# Patient Record
Sex: FEMALE | Race: WHITE | NOT HISPANIC OR LATINO
[De-identification: names, ages, dates, MRNs, and addresses within clinical notes are randomized per-mention and may not be internally consistent; named-entity substitution may affect disease eponyms.]

---

## 2023-03-15 PROBLEM — Z00.00 ENCOUNTER FOR PREVENTIVE HEALTH EXAMINATION: Status: ACTIVE | Noted: 2023-03-15

## 2023-03-16 ENCOUNTER — APPOINTMENT (OUTPATIENT)
Dept: SURGERY | Facility: CLINIC | Age: 68
End: 2023-03-16

## 2023-03-22 ENCOUNTER — APPOINTMENT (OUTPATIENT)
Dept: SURGERY | Facility: CLINIC | Age: 68
End: 2023-03-22

## 2023-03-22 ENCOUNTER — APPOINTMENT (OUTPATIENT)
Dept: SURGERY | Facility: CLINIC | Age: 68
End: 2023-03-22
Payer: MEDICARE

## 2023-03-22 DIAGNOSIS — Z85.3 PERSONAL HISTORY OF MALIGNANT NEOPLASM OF BREAST: ICD-10-CM

## 2023-03-22 PROCEDURE — 99204 OFFICE O/P NEW MOD 45 MIN: CPT | Mod: 95

## 2023-03-22 RX ORDER — APIXABAN 5 MG/1
5 TABLET, FILM COATED ORAL
Refills: 0 | Status: ACTIVE | COMMUNITY

## 2023-03-22 RX ORDER — LISINOPRIL 30 MG/1
TABLET ORAL
Refills: 0 | Status: ACTIVE | COMMUNITY

## 2023-03-22 NOTE — PLAN
[FreeTextEntry1] : 67-year-old female who status post open right inguinal hernia repair with a mesh plug since in January 2023.  She has developed some postop findings that seem to be constitutional but are highly suggestive of possible reaction to the mesh, she also developed a DVT currently on Eliquis, and she is having difficulty functioning and performing in accomplishing her daily tasks.  She will review her current status for Eliquis and get back to us based on recommendations from her doctors with regards to how long she needs to be on that.  And she will plan to see an allergist and I think that is a great idea since still do blood work and identify any increase in inflammatory factors.  Report of the MRI was reviewed with the patient and it demonstrates no obvious injuries to the nerves.  But she continues to have ongoing pain and discomfort with burning sensations in these constitutional symptoms which is highly suggestive of a reaction to the mesh.  I have had a long robust discussion with her and she would like the mesh removed and I agree.  She would require a right inguinal hernia exploration with removal of mesh and tissue repair of the right groin hernias.  Risks benefits and complications were discussed with her at length.  Questions been answered to her satisfaction and she is agreeable to proceed.  She will work with my office on a date of surgery.  I will need to see her the day before surgery and examine her in person and answer any last-minute questions that she may have.  She will need a note in preparation for surgery from her primary care provider.  We will need some guidance on management of her Eliquis if it is okay to stop for 2 days before surgery and restart probably 1 day after surgery.

## 2023-03-22 NOTE — REASON FOR VISIT
[Initial Evaluation] : an initial evaluation [FreeTextEntry1] : 67-year-old female status post open right inguinal hernia repair with a mesh plug January 13, 2023 with right groin pain and other secondary symptoms.

## 2023-03-22 NOTE — HISTORY OF PRESENT ILLNESS
[de-identified] : Is a 67-year-old female who had an incarcerated right inguinal hernia perhaps it was a femoral I am awaiting operative notes to review but she had a plug placed for the repair.  Ever since the surgery she has appreciated pain and secondary symptoms such as discoloration of the legs with bright red feet changing levels of numbness inside the right thigh, inside the right calf, back of calf and both feet.  Constant tightness behind the knee and the calf with intermittent slight numbness and tingling on the top right hand and fingers.  Hard mass in the groin with burning sensation with a slight none itching rash over the entire body.\par She feels weak, she feels that she has muscular atrophy in the right thigh.  She is very concerned and frustrated.  She also has a new diagnosis for a DVT for which she is on Eliquis and a follow-up surveillance ultrasound shows resolving DVT clot.  She presents specifically to me because she desires mesh removal and a tissue repair.

## 2023-03-22 NOTE — REVIEW OF SYSTEMS
[Negative] : Heme/Lymph [FreeTextEntry2] : Feeling tired, weak, diffuse nonspecific skin rashes without itchiness, redness of skin mostly in the feet, tingling in the fingers mostly in the right hand, burning numbing sensation in the right groin with pain. [FreeTextEntry7] : Status post right inguinal hernia repair

## 2023-03-22 NOTE — PHYSICAL EXAM
[de-identified] : 67-year-old female in no acute distress [de-identified] : Patient reports a well-healed scar right groin but feelings of discomfort, a lump in the right groin is appreciated according to her, with numbness and burning sensation that extends down the right thigh.

## 2023-04-01 ENCOUNTER — NON-APPOINTMENT (OUTPATIENT)
Age: 68
End: 2023-04-01

## 2023-04-07 PROBLEM — Z92.21 PERSONAL HISTORY OF ANTINEOPLASTIC CHEMOTHERAPY: Chronic | Status: ACTIVE | Noted: 2023-04-04

## 2023-04-07 PROBLEM — G57.11 MERALGIA PARESTHETICA, RIGHT LOWER LIMB: Chronic | Status: ACTIVE | Noted: 2023-04-04

## 2023-04-07 PROBLEM — R10.31 RIGHT LOWER QUADRANT PAIN: Chronic | Status: ACTIVE | Noted: 2023-04-04

## 2023-04-07 PROBLEM — I82.411 ACUTE EMBOLISM AND THROMBOSIS OF RIGHT FEMORAL VEIN: Chronic | Status: ACTIVE | Noted: 2023-04-04

## 2023-04-12 ENCOUNTER — APPOINTMENT (OUTPATIENT)
Dept: SURGERY | Facility: CLINIC | Age: 68
End: 2023-04-12
Payer: MEDICARE

## 2023-04-12 ENCOUNTER — TRANSCRIPTION ENCOUNTER (OUTPATIENT)
Age: 68
End: 2023-04-12

## 2023-04-12 VITALS
OXYGEN SATURATION: 97 % | HEART RATE: 74 BPM | SYSTOLIC BLOOD PRESSURE: 105 MMHG | DIASTOLIC BLOOD PRESSURE: 75 MMHG | HEIGHT: 65 IN | BODY MASS INDEX: 15.66 KG/M2 | TEMPERATURE: 97.5 F | WEIGHT: 94 LBS | RESPIRATION RATE: 14 BRPM

## 2023-04-12 DIAGNOSIS — R10.31 RIGHT LOWER QUADRANT PAIN: ICD-10-CM

## 2023-04-12 DIAGNOSIS — I82.409 ACUTE EMBOLISM AND THROMBOSIS OF UNSPECIFIED DEEP VEINS OF UNSPECIFIED LOWER EXTREMITY: ICD-10-CM

## 2023-04-12 DIAGNOSIS — Z98.890 OTHER SPECIFIED POSTPROCEDURAL STATES: ICD-10-CM

## 2023-04-12 DIAGNOSIS — Z87.19 OTHER SPECIFIED POSTPROCEDURAL STATES: ICD-10-CM

## 2023-04-12 PROCEDURE — 99215 OFFICE O/P EST HI 40 MIN: CPT | Mod: 57

## 2023-04-12 PROCEDURE — 99205 OFFICE O/P NEW HI 60 MIN: CPT

## 2023-04-12 NOTE — REASON FOR VISIT
[Initial Evaluation] : an initial evaluation [FreeTextEntry1] : preop eval, for chronic pain right groin s/p mesh repair of right femoral hernia.

## 2023-04-12 NOTE — HISTORY OF PRESENT ILLNESS
[de-identified] : 67-year-old female who status post open right inguinal hernia repair with a mesh plug since in January 2023. She has developed some postop findings that seem to be constitutional but are highly suggestive of possible reaction to the mesh, she also developed a DVT currently on Eliquis, and she is having difficulty functioning and performing in accomplishing her daily tasks. She will review her current status for Eliquis and get back to us based on recommendations from her doctors with regards to how long she needs to be on that. And she will plan to see an allergist and I think that is a great idea since still do blood work and identify any increase in inflammatory factors. Report of the MRI was reviewed with the patient and it demonstrates no obvious injuries to the nerves. But she continues to have ongoing pain and discomfort with burning sensations in these constitutional symptoms which is highly suggestive of a reaction to the mesh. I have had a long robust discussion with her and she would like the mesh removed and I agree. She would require a right inguinal hernia exploration with removal of mesh and tissue repair of the right groin hernias. Risks benefits and complications were discussed with her at length. Questions been answered to her satisfaction and she is agreeable to proceed. She will work with my office on a date of surgery. I will need to see her the day before surgery and examine her in person and answer any last-minute questions that she may have. She will need a note in preparation for surgery from her primary care provider. We will need some guidance on management of her Eliquis if it is okay to stop for 2 days before surgery and restart probably 1 day after surgery.   She has since seen the vascular surgeon who has reviewed the venogram and reports that the srikanth will likely open after the mesh is removed, to hold her eliquis for 2 days prior surgery and restat 24 hours after surgery\par

## 2023-04-12 NOTE — PLAN
[FreeTextEntry1] : 67-year-old female with chronic pain secondary to mesh implantation right groin for a femoral hernia repair with plug.  She is also sustained a DVT and she is currently on Eliquis.  A preoperative venogram was undertaken which was reviewed by a local vascular surgeon who reports that she is okay to hold her Eliquis for 2 days prior to surgery and she is done that and it is okay to restart approximately 24 hours after surgery.  She has been counseled on a exploration of the right groin with explantation of mesh and tissue repair of the inguinal hernia and femoral hernia.  Risks benefits and complications of been discussed with her at length.  Questions been answered to her satisfaction.  And she is agreeable to proceed.

## 2023-04-12 NOTE — PHYSICAL EXAM
[Normal Breath Sounds] : Normal breath sounds [Normal Heart Sounds] : normal heart sounds [de-identified] : Healthy-appearing 67-year-old female no acute distress [de-identified] : PERRLA EOMI anicteric sclera [de-identified] : Supple [de-identified] : Well-healed right groin scar.  Dense subcutaneous nodularity appreciated likely the mesh in the femoral space.  The area is exquisitely tender to palpation.  This is adjacent to the femoral vessels.  No obvious signs of infection are noted.  The left groin is examined she may have a recurrent bulge on Valsalva right at the pubis.  She also has a well-healed scar from prior tissue repair in the left groin.

## 2023-04-13 ENCOUNTER — OUTPATIENT (OUTPATIENT)
Dept: OUTPATIENT SERVICES | Facility: HOSPITAL | Age: 68
LOS: 1 days | End: 2023-04-13
Payer: MEDICARE

## 2023-04-13 ENCOUNTER — TRANSCRIPTION ENCOUNTER (OUTPATIENT)
Age: 68
End: 2023-04-13

## 2023-04-13 ENCOUNTER — RESULT REVIEW (OUTPATIENT)
Age: 68
End: 2023-04-13

## 2023-04-13 ENCOUNTER — APPOINTMENT (OUTPATIENT)
Dept: SURGERY | Facility: HOSPITAL | Age: 68
End: 2023-04-13

## 2023-04-13 VITALS
HEART RATE: 76 BPM | SYSTOLIC BLOOD PRESSURE: 119 MMHG | OXYGEN SATURATION: 96 % | DIASTOLIC BLOOD PRESSURE: 68 MMHG | RESPIRATION RATE: 16 BRPM | TEMPERATURE: 98 F

## 2023-04-13 VITALS
HEART RATE: 77 BPM | DIASTOLIC BLOOD PRESSURE: 73 MMHG | TEMPERATURE: 98 F | OXYGEN SATURATION: 97 % | WEIGHT: 95.02 LBS | HEIGHT: 65 IN | RESPIRATION RATE: 18 BRPM | SYSTOLIC BLOOD PRESSURE: 115 MMHG

## 2023-04-13 DIAGNOSIS — C50.912 MALIGNANT NEOPLASM OF UNSPECIFIED SITE OF LEFT FEMALE BREAST: Chronic | ICD-10-CM

## 2023-04-13 DIAGNOSIS — Z98.890 OTHER SPECIFIED POSTPROCEDURAL STATES: ICD-10-CM

## 2023-04-13 DIAGNOSIS — Z98.890 OTHER SPECIFIED POSTPROCEDURAL STATES: Chronic | ICD-10-CM

## 2023-04-13 DIAGNOSIS — R10.31 RIGHT LOWER QUADRANT PAIN: ICD-10-CM

## 2023-04-13 DIAGNOSIS — Z90.13 ACQUIRED ABSENCE OF BILATERAL BREASTS AND NIPPLES: Chronic | ICD-10-CM

## 2023-04-13 LAB
GRAM STN FLD: SIGNIFICANT CHANGE UP
SPECIMEN SOURCE: SIGNIFICANT CHANGE UP

## 2023-04-13 PROCEDURE — 49553 RPR FEM HERNIA INIT BLOCKED: CPT | Mod: RT

## 2023-04-13 PROCEDURE — 49505 PRP I/HERN INIT REDUC >5 YR: CPT | Mod: RT

## 2023-04-13 PROCEDURE — 49505 PRP I/HERN INIT REDUC >5 YR: CPT | Mod: AS,22,RT

## 2023-04-13 PROCEDURE — 88300 SURGICAL PATH GROSS: CPT | Mod: 26,59

## 2023-04-13 PROCEDURE — 49553 RPR FEM HERNIA INIT BLOCKED: CPT | Mod: AS,22,RT

## 2023-04-13 PROCEDURE — 87075 CULTR BACTERIA EXCEPT BLOOD: CPT

## 2023-04-13 PROCEDURE — 88300 SURGICAL PATH GROSS: CPT

## 2023-04-13 PROCEDURE — 13101 CMPLX RPR TRUNK 2.6-7.5 CM: CPT | Mod: XS

## 2023-04-13 PROCEDURE — 87205 SMEAR GRAM STAIN: CPT

## 2023-04-13 PROCEDURE — 87102 FUNGUS ISOLATION CULTURE: CPT

## 2023-04-13 PROCEDURE — C1889: CPT

## 2023-04-13 PROCEDURE — 88304 TISSUE EXAM BY PATHOLOGIST: CPT | Mod: 26

## 2023-04-13 PROCEDURE — 87070 CULTURE OTHR SPECIMN AEROBIC: CPT

## 2023-04-13 PROCEDURE — 88304 TISSUE EXAM BY PATHOLOGIST: CPT

## 2023-04-13 PROCEDURE — 20680 REMOVAL OF IMPLANT DEEP: CPT | Mod: XS

## 2023-04-13 DEVICE — CLIP APPLIER ETHICON LIGACLIP 9 3/8" SMALL: Type: IMPLANTABLE DEVICE | Status: FUNCTIONAL

## 2023-04-13 DEVICE — SURGIFLO HEMOSTATIC MATRIX KIT: Type: IMPLANTABLE DEVICE | Status: FUNCTIONAL

## 2023-04-13 RX ORDER — OXYCODONE HYDROCHLORIDE 5 MG/1
5 TABLET ORAL ONCE
Refills: 0 | Status: DISCONTINUED | OUTPATIENT
Start: 2023-04-13 | End: 2023-04-13

## 2023-04-13 RX ORDER — ONDANSETRON 8 MG/1
1 TABLET, FILM COATED ORAL
Qty: 1 | Refills: 0
Start: 2023-04-13 | End: 2023-04-15

## 2023-04-13 RX ORDER — OXYCODONE HYDROCHLORIDE 5 MG/1
1 TABLET ORAL
Qty: 18 | Refills: 0
Start: 2023-04-13 | End: 2023-04-15

## 2023-04-13 RX ORDER — ONDANSETRON 8 MG/1
4 TABLET, FILM COATED ORAL ONCE
Refills: 0 | Status: COMPLETED | OUTPATIENT
Start: 2023-04-13 | End: 2023-04-13

## 2023-04-13 RX ORDER — SODIUM CHLORIDE 9 MG/ML
1000 INJECTION, SOLUTION INTRAVENOUS
Refills: 0 | Status: DISCONTINUED | OUTPATIENT
Start: 2023-04-13 | End: 2023-04-13

## 2023-04-13 RX ORDER — HYDROMORPHONE HYDROCHLORIDE 2 MG/ML
0.5 INJECTION INTRAMUSCULAR; INTRAVENOUS; SUBCUTANEOUS
Refills: 0 | Status: DISCONTINUED | OUTPATIENT
Start: 2023-04-13 | End: 2023-04-13

## 2023-04-13 RX ORDER — ONDANSETRON 8 MG/1
8 TABLET, FILM COATED ORAL ONCE
Refills: 0 | Status: COMPLETED | OUTPATIENT
Start: 2023-04-13 | End: 2023-04-13

## 2023-04-13 RX ADMIN — HYDROMORPHONE HYDROCHLORIDE 0.5 MILLIGRAM(S): 2 INJECTION INTRAMUSCULAR; INTRAVENOUS; SUBCUTANEOUS at 15:12

## 2023-04-13 RX ADMIN — ONDANSETRON 4 MILLIGRAM(S): 8 TABLET, FILM COATED ORAL at 14:15

## 2023-04-13 RX ADMIN — SODIUM CHLORIDE 50 MILLILITER(S): 9 INJECTION, SOLUTION INTRAVENOUS at 10:12

## 2023-04-13 RX ADMIN — HYDROMORPHONE HYDROCHLORIDE 0.5 MILLIGRAM(S): 2 INJECTION INTRAMUSCULAR; INTRAVENOUS; SUBCUTANEOUS at 14:14

## 2023-04-13 RX ADMIN — ONDANSETRON 8 MILLIGRAM(S): 8 TABLET, FILM COATED ORAL at 18:38

## 2023-04-13 RX ADMIN — HYDROMORPHONE HYDROCHLORIDE 0.5 MILLIGRAM(S): 2 INJECTION INTRAMUSCULAR; INTRAVENOUS; SUBCUTANEOUS at 14:24

## 2023-04-13 RX ADMIN — HYDROMORPHONE HYDROCHLORIDE 0.5 MILLIGRAM(S): 2 INJECTION INTRAMUSCULAR; INTRAVENOUS; SUBCUTANEOUS at 15:02

## 2023-04-13 NOTE — ASU DISCHARGE PLAN (ADULT/PEDIATRIC) - CARE PROVIDER_API CALL
Celeste Ramos)  Surgery  [address] [place] [address]  Phone: [phone]  Fax: [phone]  Follow Up Time: 2 weeks

## 2023-04-13 NOTE — BRIEF OPERATIVE NOTE - NSICDXBRIEFPROCEDURE_GEN_ALL_CORE_FT
PROCEDURES:  Removal of right inguinal mesh 13-Apr-2023 14:29:58 in femoral space Venus Olivier  Repair of recurrent femoral hernia 13-Apr-2023 14:30:25  Venus Olivier   PROCEDURES:  Exploration of right groin 13-Apr-2023 15:41:37 removal of mesh Venus Olivier  Open repair of femoral hernia in adult 13-Apr-2023 15:44:06 using wire and repair of inguinal hernia Venus Olivier

## 2023-04-13 NOTE — ASU DISCHARGE PLAN (ADULT/PEDIATRIC) - CALL YOUR DOCTOR IF YOU HAVE ANY OF THE FOLLOWING:
Bleeding that does not stop/Swelling that gets worse/Pain not relieved by Medications/Fever greater than (need to indicate Fahrenheit or Celsius)/Wound/Surgical Site with redness, or foul smelling discharge or pus/Unable to urinate Bleeding that does not stop/Swelling that gets worse/Pain not relieved by Medications/Fever greater than (need to indicate Fahrenheit or Celsius)/Wound/Surgical Site with redness, or foul smelling discharge or pus/Nausea and vomiting that does not stop/Unable to urinate

## 2023-04-13 NOTE — ASU DISCHARGE PLAN (ADULT/PEDIATRIC) - ASU DC SPECIAL INSTRUCTIONSFT
May shower tomorrow am  Take extra strength tylenol 500 mg 2 tabs every 6 hours , alternate with oxycodone to maintain good pain control.      ice pack to right groin   Lots of walking if tolerated   Drink plenty of water   Take a stool softener or a laxative if no BM in 48 hours to avoid constipation   Follow up with Dr Ramos in 2 - 3 weeks.

## 2023-04-13 NOTE — BRIEF OPERATIVE NOTE - OPERATION/FINDINGS
painful femoral mesh painful femoral mesh, canal cultured , mesh removed   repaired femoral and inguinal hernias

## 2023-04-13 NOTE — ASU PATIENT PROFILE, ADULT - NSICDXPASTMEDICALHX_GEN_ALL_CORE_FT
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

## 2023-04-13 NOTE — ASU PATIENT PROFILE, ADULT - NSICDXPASTSURGICALHX_GEN_ALL_CORE_FT
PAST SURGICAL HISTORY:  Bilateral breast cancer     H/O mastectomy, bilateral     H/O right inguinal hernia repair

## 2023-04-13 NOTE — BRIEF OPERATIVE NOTE - NSICDXBRIEFPREOP_GEN_ALL_CORE_FT
PRE-OP DIAGNOSIS:  History of femoral hernia repair 13-Apr-2023 14:20:48 now with painful mesh Venus Olivier

## 2023-04-13 NOTE — ASU DISCHARGE PLAN (ADULT/PEDIATRIC) - NS MD DC FALL RISK RISK
For information on Fall & Injury Prevention, visit: [email or web address] OR  [email or web address] OR  https://www.cdc.gov/steadi/patient.html

## 2023-04-13 NOTE — ASU DISCHARGE PLAN (ADULT/PEDIATRIC) - PROCEDURE
S/P Removal of mesh ,repair of right inguinal/femoral hernia S/P Exploration of right groin,  Removal of mesh ,repair of right inguinal/femoral hernia

## 2023-04-13 NOTE — BRIEF OPERATIVE NOTE - NSICDXBRIEFPOSTOP_GEN_ALL_CORE_FT
POST-OP DIAGNOSIS:  History of femoral hernia repair 13-Apr-2023 14:21:07 now with painful mesh Venus Olivier

## 2023-04-15 ENCOUNTER — INPATIENT (INPATIENT)
Facility: HOSPITAL | Age: 68
LOS: 3 days | Discharge: NURSING, REHAB OR LONG-TERM CARE | DRG: 640 | End: 2023-04-19
Attending: HOSPITALIST | Admitting: STUDENT IN AN ORGANIZED HEALTH CARE EDUCATION/TRAINING PROGRAM
Payer: MEDICARE

## 2023-04-15 VITALS
OXYGEN SATURATION: 96 % | HEIGHT: 65 IN | TEMPERATURE: 98 F | HEART RATE: 72 BPM | SYSTOLIC BLOOD PRESSURE: 123 MMHG | RESPIRATION RATE: 20 BRPM | DIASTOLIC BLOOD PRESSURE: 74 MMHG | WEIGHT: 95.02 LBS

## 2023-04-15 DIAGNOSIS — Z98.890 OTHER SPECIFIED POSTPROCEDURAL STATES: Chronic | ICD-10-CM

## 2023-04-15 DIAGNOSIS — R10.9 UNSPECIFIED ABDOMINAL PAIN: ICD-10-CM

## 2023-04-15 DIAGNOSIS — Z90.13 ACQUIRED ABSENCE OF BILATERAL BREASTS AND NIPPLES: Chronic | ICD-10-CM

## 2023-04-15 DIAGNOSIS — C50.912 MALIGNANT NEOPLASM OF UNSPECIFIED SITE OF LEFT FEMALE BREAST: Chronic | ICD-10-CM

## 2023-04-15 DIAGNOSIS — I80.10 PHLEBITIS AND THROMBOPHLEBITIS OF UNSPECIFIED FEMORAL VEIN: ICD-10-CM

## 2023-04-15 LAB
ALBUMIN SERPL ELPH-MCNC: 3.2 G/DL — LOW (ref 3.3–5)
ALP SERPL-CCNC: 55 U/L — SIGNIFICANT CHANGE UP (ref 40–120)
ALT FLD-CCNC: 28 U/L — SIGNIFICANT CHANGE UP (ref 10–45)
ANION GAP SERPL CALC-SCNC: 8 MMOL/L — SIGNIFICANT CHANGE UP (ref 5–17)
APTT BLD: 25.5 SEC — LOW (ref 27.5–35.5)
AST SERPL-CCNC: 31 U/L — SIGNIFICANT CHANGE UP (ref 10–40)
BASOPHILS # BLD AUTO: 0.06 K/UL — SIGNIFICANT CHANGE UP (ref 0–0.2)
BASOPHILS NFR BLD AUTO: 0.6 % — SIGNIFICANT CHANGE UP (ref 0–2)
BILIRUB SERPL-MCNC: 0.5 MG/DL — SIGNIFICANT CHANGE UP (ref 0.2–1.2)
BLD GP AB SCN SERPL QL: SIGNIFICANT CHANGE UP
BUN SERPL-MCNC: 12 MG/DL — SIGNIFICANT CHANGE UP (ref 7–23)
CALCIUM SERPL-MCNC: 9.1 MG/DL — SIGNIFICANT CHANGE UP (ref 8.4–10.5)
CHLORIDE SERPL-SCNC: 106 MMOL/L — SIGNIFICANT CHANGE UP (ref 96–108)
CO2 SERPL-SCNC: 28 MMOL/L — SIGNIFICANT CHANGE UP (ref 22–31)
CREAT SERPL-MCNC: 0.77 MG/DL — SIGNIFICANT CHANGE UP (ref 0.5–1.3)
EGFR: 84 ML/MIN/1.73M2 — SIGNIFICANT CHANGE UP
EOSINOPHIL # BLD AUTO: 0.07 K/UL — SIGNIFICANT CHANGE UP (ref 0–0.5)
EOSINOPHIL NFR BLD AUTO: 0.7 % — SIGNIFICANT CHANGE UP (ref 0–6)
FLUAV AG NPH QL: SIGNIFICANT CHANGE UP
FLUBV AG NPH QL: SIGNIFICANT CHANGE UP
GLUCOSE SERPL-MCNC: 107 MG/DL — HIGH (ref 70–99)
HCT VFR BLD CALC: 37.9 % — SIGNIFICANT CHANGE UP (ref 34.5–45)
HGB BLD-MCNC: 13 G/DL — SIGNIFICANT CHANGE UP (ref 11.5–15.5)
IMM GRANULOCYTES NFR BLD AUTO: 0.4 % — SIGNIFICANT CHANGE UP (ref 0–0.9)
INR BLD: 1.24 RATIO — HIGH (ref 0.88–1.16)
LYMPHOCYTES # BLD AUTO: 0.67 K/UL — LOW (ref 1–3.3)
LYMPHOCYTES # BLD AUTO: 6.6 % — LOW (ref 13–44)
MCHC RBC-ENTMCNC: 33.7 PG — SIGNIFICANT CHANGE UP (ref 27–34)
MCHC RBC-ENTMCNC: 34.3 GM/DL — SIGNIFICANT CHANGE UP (ref 32–36)
MCV RBC AUTO: 98.2 FL — SIGNIFICANT CHANGE UP (ref 80–100)
MONOCYTES # BLD AUTO: 0.76 K/UL — SIGNIFICANT CHANGE UP (ref 0–0.9)
MONOCYTES NFR BLD AUTO: 7.5 % — SIGNIFICANT CHANGE UP (ref 2–14)
NEUTROPHILS # BLD AUTO: 8.51 K/UL — HIGH (ref 1.8–7.4)
NEUTROPHILS NFR BLD AUTO: 84.2 % — HIGH (ref 43–77)
NRBC # BLD: 0 /100 WBCS — SIGNIFICANT CHANGE UP (ref 0–0)
PLATELET # BLD AUTO: 146 K/UL — LOW (ref 150–400)
POTASSIUM SERPL-MCNC: 4 MMOL/L — SIGNIFICANT CHANGE UP (ref 3.5–5.3)
POTASSIUM SERPL-SCNC: 4 MMOL/L — SIGNIFICANT CHANGE UP (ref 3.5–5.3)
PROT SERPL-MCNC: 6 G/DL — SIGNIFICANT CHANGE UP (ref 6–8.3)
PROTHROM AB SERPL-ACNC: 14.4 SEC — HIGH (ref 10.5–13.4)
RBC # BLD: 3.86 M/UL — SIGNIFICANT CHANGE UP (ref 3.8–5.2)
RBC # FLD: 11.3 % — SIGNIFICANT CHANGE UP (ref 10.3–14.5)
RSV RNA NPH QL NAA+NON-PROBE: SIGNIFICANT CHANGE UP
SARS-COV-2 RNA SPEC QL NAA+PROBE: SIGNIFICANT CHANGE UP
SODIUM SERPL-SCNC: 142 MMOL/L — SIGNIFICANT CHANGE UP (ref 135–145)
TROPONIN I, HIGH SENSITIVITY RESULT: 8.6 NG/L — SIGNIFICANT CHANGE UP
WBC # BLD: 10.11 K/UL — SIGNIFICANT CHANGE UP (ref 3.8–10.5)
WBC # FLD AUTO: 10.11 K/UL — SIGNIFICANT CHANGE UP (ref 3.8–10.5)

## 2023-04-15 PROCEDURE — 99223 1ST HOSP IP/OBS HIGH 75: CPT

## 2023-04-15 PROCEDURE — 93971 EXTREMITY STUDY: CPT | Mod: 26,RT

## 2023-04-15 PROCEDURE — 99285 EMERGENCY DEPT VISIT HI MDM: CPT

## 2023-04-15 PROCEDURE — 93010 ELECTROCARDIOGRAM REPORT: CPT

## 2023-04-15 PROCEDURE — 74176 CT ABD & PELVIS W/O CONTRAST: CPT | Mod: 26,MA

## 2023-04-15 RX ORDER — SODIUM CHLORIDE 9 MG/ML
1000 INJECTION INTRAMUSCULAR; INTRAVENOUS; SUBCUTANEOUS
Refills: 0 | Status: ACTIVE | OUTPATIENT
Start: 2023-04-15 | End: 2023-04-16

## 2023-04-15 RX ORDER — SODIUM CHLORIDE 9 MG/ML
1000 INJECTION INTRAMUSCULAR; INTRAVENOUS; SUBCUTANEOUS ONCE
Refills: 0 | Status: COMPLETED | OUTPATIENT
Start: 2023-04-15 | End: 2023-04-15

## 2023-04-15 RX ORDER — LANOLIN ALCOHOL/MO/W.PET/CERES
3 CREAM (GRAM) TOPICAL AT BEDTIME
Refills: 0 | Status: DISCONTINUED | OUTPATIENT
Start: 2023-04-15 | End: 2023-04-19

## 2023-04-15 RX ORDER — TRAMADOL HYDROCHLORIDE 50 MG/1
25 TABLET ORAL ONCE
Refills: 0 | Status: DISCONTINUED | OUTPATIENT
Start: 2023-04-15 | End: 2023-04-15

## 2023-04-15 RX ORDER — TRAMADOL HYDROCHLORIDE 50 MG/1
50 TABLET ORAL EVERY 6 HOURS
Refills: 0 | Status: DISCONTINUED | OUTPATIENT
Start: 2023-04-15 | End: 2023-04-19

## 2023-04-15 RX ORDER — ACETAMINOPHEN 500 MG
650 TABLET ORAL EVERY 6 HOURS
Refills: 0 | Status: DISCONTINUED | OUTPATIENT
Start: 2023-04-15 | End: 2023-04-19

## 2023-04-15 RX ORDER — LISINOPRIL 2.5 MG/1
7.5 TABLET ORAL DAILY
Refills: 0 | Status: DISCONTINUED | OUTPATIENT
Start: 2023-04-15 | End: 2023-04-15

## 2023-04-15 RX ORDER — TRAMADOL HYDROCHLORIDE 50 MG/1
25 TABLET ORAL EVERY 6 HOURS
Refills: 0 | Status: DISCONTINUED | OUTPATIENT
Start: 2023-04-15 | End: 2023-04-19

## 2023-04-15 RX ORDER — ACETAMINOPHEN 500 MG
2 TABLET ORAL
Refills: 0 | DISCHARGE

## 2023-04-15 RX ORDER — APIXABAN 2.5 MG/1
5 TABLET, FILM COATED ORAL EVERY 12 HOURS
Refills: 0 | Status: DISCONTINUED | OUTPATIENT
Start: 2023-04-15 | End: 2023-04-19

## 2023-04-15 RX ORDER — MORPHINE SULFATE 50 MG/1
2 CAPSULE, EXTENDED RELEASE ORAL ONCE
Refills: 0 | Status: DISCONTINUED | OUTPATIENT
Start: 2023-04-15 | End: 2023-04-15

## 2023-04-15 RX ORDER — LISINOPRIL 2.5 MG/1
2.5 TABLET ORAL
Refills: 0 | Status: DISCONTINUED | OUTPATIENT
Start: 2023-04-15 | End: 2023-04-19

## 2023-04-15 RX ORDER — ONDANSETRON 8 MG/1
4 TABLET, FILM COATED ORAL ONCE
Refills: 0 | Status: COMPLETED | OUTPATIENT
Start: 2023-04-15 | End: 2023-04-15

## 2023-04-15 RX ORDER — ONDANSETRON 8 MG/1
4 TABLET, FILM COATED ORAL EVERY 8 HOURS
Refills: 0 | Status: DISCONTINUED | OUTPATIENT
Start: 2023-04-15 | End: 2023-04-19

## 2023-04-15 RX ADMIN — SODIUM CHLORIDE 60 MILLILITER(S): 9 INJECTION INTRAMUSCULAR; INTRAVENOUS; SUBCUTANEOUS at 17:53

## 2023-04-15 RX ADMIN — SODIUM CHLORIDE 60 MILLILITER(S): 9 INJECTION INTRAMUSCULAR; INTRAVENOUS; SUBCUTANEOUS at 19:29

## 2023-04-15 RX ADMIN — TRAMADOL HYDROCHLORIDE 25 MILLIGRAM(S): 50 TABLET ORAL at 13:45

## 2023-04-15 RX ADMIN — SODIUM CHLORIDE 1000 MILLILITER(S): 9 INJECTION INTRAMUSCULAR; INTRAVENOUS; SUBCUTANEOUS at 13:14

## 2023-04-15 RX ADMIN — LISINOPRIL 2.5 MILLIGRAM(S): 2.5 TABLET ORAL at 21:34

## 2023-04-15 RX ADMIN — APIXABAN 5 MILLIGRAM(S): 2.5 TABLET, FILM COATED ORAL at 15:38

## 2023-04-15 RX ADMIN — ONDANSETRON 4 MILLIGRAM(S): 8 TABLET, FILM COATED ORAL at 13:13

## 2023-04-15 RX ADMIN — TRAMADOL HYDROCHLORIDE 25 MILLIGRAM(S): 50 TABLET ORAL at 13:13

## 2023-04-15 NOTE — ED PROVIDER NOTE - OBJECTIVE STATEMENT
67-year-old female with past medical history hypertension surgical history of inguinal hernia repair with mesh with revision removal of mesh 413 presenting to the ED with nausea.  abdominal pain without associated vomiting or diarrhea.  Patient denies any dysuria urgency or frequency, history of DVT on Eliquis and right lower extremity.  Patient reports has not been taking Percocet secondary to nausea, took Zofran 1 hours prior to arrival.  No other complaints. As per family patient also had syncopal episode while attempting to get out of chair earlier at the time patient complaining of pain and nausea.

## 2023-04-15 NOTE — ED PROVIDER NOTE - CLINICAL SUMMARY MEDICAL DECISION MAKING FREE TEXT BOX
67-year-old female with past medical history hypertension surgical history of inguinal hernia repair with mesh with revision removal of mesh 413 presenting to the ED with nausea.  abdominal pain without associated vomiting or diarrhea.  Patient denies any dysuria urgency or frequency, history of DVT on Eliquis and right lower extremity.  Patient reports has not been taking Percocet secondary to nausea, took Zofran 1 hours prior to arrival.  No other complaints. As per family patient also had syncopal episode while attempting to get out of chair earlier at the time patient complaining of pain and nausea.    No acute surgical finding on CT, patient to be admitted for pain control antiemetics until feeling symptomatically improved to increase ambulation pending ultrasound DVT study although will not change clinical management as patient is already on Eliquis.  Discussed with Dr. Ramirez.

## 2023-04-15 NOTE — PATIENT PROFILE ADULT - FALL HARM RISK - RISK INTERVENTIONS
Assistance OOB with selected safe patient handling equipment/Assistance with ambulation/Communicate Fall Risk and Risk Factors to all staff, patient, and family/Monitor gait and stability/Reinforce activity limits and safety measures with patient and family/Sit up slowly, dangle for a short time, stand at bedside before walking/Use of alarms - bed, chair and/or voice tab/Visual Cue: Yellow wristband/Bed in lowest position, wheels locked, appropriate side rails in place/Call bell, personal items and telephone in reach/Instruct patient to call for assistance before getting out of bed or chair/Non-slip footwear when patient is out of bed/[place] to call system/Physically safe environment - no spills, clutter or unnecessary equipment/Purposeful Proactive Rounding/Room/bathroom lighting operational, light cord in reach

## 2023-04-15 NOTE — H&P ADULT - HISTORY OF PRESENT ILLNESS
syndrome     GERD (gastroesophageal reflux disease)     History of ischemic heart disease 03/30/2017    no cardiology    Hyperlipidemia     Hypertension     Obesity     PVD (peripheral vascular disease) with claudication ([facility] [address] 8/25/2017    Tobacco abuse     Tobacco abuse     Type II or unspecified type diabetes mellitus without mention of complication, not stated as uncontrolled     Vitamin D deficiency        Review of Systems:   Review of Systems   Constitutional: Negative for chills and fever. HENT: Negative for congestion and rhinorrhea. Respiratory: Positive for cough and shortness of breath. Change in sputum   Cardiovascular: Negative for chest pain and leg swelling. Gastrointestinal: Negative for abdominal pain, nausea and vomiting. Genitourinary: Negative for dysuria and hematuria. Musculoskeletal: Negative for arthralgias and myalgias. Skin: Negative for pallor and wound. Neurological: Negative for dizziness and light-headedness. Physical Exam   Vitals: /71   Pulse 101   Temp 96.8 °F (36 °C) (Temporal)   Resp 18   Ht 5' 5\" (1.651 m)   Wt 193 lb (87.5 kg)   LMP 03/20/2015   BMI 32.12 kg/m²   Physical Exam    General:  Well developed, well nourished, well groomed. No apparent distress. HEENT:  Normocephalic, atraumatic. No scleral icterus. No conjunctival injection. No nasal discharge. Heart:  RRR, no murmurs, gallops, or rubs  Lungs:  CTA bilaterally, bilat symmetrical expansion, diffuse wheezing and coarse breath sounds in all lung fields bilaterally. Wearing supplemental oxygen. No respiratory distress  Abdomen: Bowel sounds present, soft, nontender, no masses, no organomegaly, no peritoneal signs  Extremities:  No clubbing, cyanosis, or edema  Neuro:  Alert and oriented x3, no focal deficits      Assessment and Plan       1.  Uncontrolled type 2 diabetes mellitus with circulatory disorder, with long-term current use of insulin (HCC)  - Increase in conditions. Information on many health conditions is provided by [facility] [employer]: [email or web address]  Please bring any questions to me at your nextvisit. Return to Office: Return in about 6 weeks (around 5/14/2021), or or sooner if symptoms worsen or fail to improve, for COPD, diabetes.     Medication List:    Current Outpatient Medications   Medication Sig Dispense Refill    Continuous Glucose Monitor Sup KIT 1 kit by Does not apply route daily 1 kit 5    Glucagon, rDNA, (GLUCAGON EMERGENCY) 1 MG KIT Inject 1 mg as directed as needed (hypoglycemia) 1 kit 0    doxycycline hyclate (VIBRA-TABS) 100 MG tablet Take 1 tablet by mouth 2 times daily for 7 days 14 tablet 0    budesonide (PULMICORT) 0.25 MG/2ML nebulizer suspension Take 2 mLs by nebulization 2 times daily 60 ampule 3    insulin lispro, 1 Unit Dial, (HUMALOG KWIKPEN) 100 UNIT/ML SOPN 5-12 units SC before meals as per sliding scale 3 pen 5    insulin glargine (BASAGLAR KWIKPEN) 100 UNIT/ML injection pen Inject 80 Units into the skin 2 times daily 5 pen 5    vitamin D (ERGOCALCIFEROL) 1.25 MG (67261 UT) CAPS capsule TAKE 1 CAPSULE PO q weekly 12 capsule 5    naproxen sodium (ANAPROX) 550 MG tablet Take 1 tablet by mouth 2 times daily (with meals) 60 tablet 5    traZODone (DESYREL) 150 MG tablet TAKE 1 TABLET BY MOUTH EVERY NIGHT AT BEDTIME 30 tablet 10    fenofibrate (TRICOR) 54 MG tablet Take 1 tablet by mouth daily 30 tablet 5    tiZANidine (ZANAFLEX) 2 MG tablet Take 1 tablet by mouth every 8 hours as needed (muscle spams) 60 tablet 2    atorvastatin (LIPITOR) 40 MG tablet Take 1 tablet by mouth daily 30 tablet 11    doxepin (SINEQUAN) 10 MG capsule TAKE 1 CAPSULE BY MOUTH EVERY NIGHT 30 capsule 5    ipratropium-albuterol (DUONEB) 0.5-2.5 (3) MG/3ML SOLN nebulizer solution USE 3 ML VIA NEBULIZER EVERY 4 HOURS AS NEEDED FOR SHORTNESS OF BREATH 540 mL 5    DULoxetine (CYMBALTA) 30 MG extended release capsule each 5    ketoconazole (NIZORAL) 2 % cream Apply to both feet daily. (Patient taking differently: Apply topically daily as needed Apply to both feet) 30 g 2    vitamin B-12 (CYANOCOBALAMIN) 1000 MCG tablet Take 1 tablet by mouth daily. (Patient taking differently: Take 1,000 mcg by mouth every morning ) 30 tablet 11     No current facility-administered medications for this visit. Bo Jerez MD     This document may have been prepared at least partially through the use of voice recognition software. Although effort is taken to assure the accuracy ofthis document, it is possible that grammatical, syntax,  or spelling errors may occur. 67 year old F PMH HTN, inguinal hernia s/p repair with mesh 1/13/2023 with removal of mesh and open repair of hernia, DVT (after surgery) RLE now on Eliquis presented to the ED with abdominal pain without vomiting. Patient states she was discharged with Percocet but has not taken medication due to her feeling nauseous with it, took zofran 1 hour prior to arrival to ED. As per family, patient had near syncopal episode when she attempted to get out of chair but did not have LOC. At this time, patient continues to have abd pain and nausea. Denies fever, chills, chest pain, sob, palpitations, diarrhea. Of note, patient had DVT RLE post op in Jan 2023 due to R femoral nerve compression now on Eliquis. She also states she has not been moving much at home since surgical repair.    In the ED, T 97.9F, HR 72, /74, RR 20, SpO2 96% on RA. Labs showed normal CBC and CMP, trop negative, PT/INR 14.4/1.24, COVID/RVP negative. Received zofran 4mg IVP x1, tramadol 25mg POx1, and NS bolus x1L in the ED.    CT abd/pelvis without contrast: No small or large bowel significant distention. Bowel loops in the pelvis are incompletely characterized due to absence of contrast and paucity of intrapelvic fat. Small dependent pelvic intraperitoneal fluid of unclear etiology. Also, there is air and fluid in the dependent anterior pelvis adjacent to the right inguinal region, of unclear significance. Of note, there is a 4.0 x 1.6 cm ovoid intrapelvic low-density structure along the right inguinal region with adjacent mass effect on the urinary bladder, image 82 series 2. Postop changes can be considered. Associated superimposed infection or developing collection is not excluded. Considerable right lower abdominal wall and inguinal air/fluid along the superficial soft tissues adjacent adjacent to postsurgical material.

## 2023-04-15 NOTE — ED ADULT NURSE NOTE - OBJECTIVE STATEMENT
s/p surgery yesterday, not taking pain  meds due to nausea Incision site clean and dry no s/s of infection

## 2023-04-15 NOTE — CONSULT NOTE ADULT - ASSESSMENT
67 year old female with hx of htn, presents for repair of femoral and inguinal hernia and removal of mesh causing vascular and neuro symptoms.   Patient admitted postop c/o vomiting, pain , unable to eat.         IMP;    Dehydration             nausea             poor pain management - patient did not take pain meds             lower extremity venous doppler negative for DVT             afebrile             CT  of abd /pelvis - post surgical changes           Discussed with Dr Ramos     Plan:   admit to medicine            Iv fluids             diet            pain management             eliquis             re evaluate in am     67 year old female with hx of htn, presents for repair of femoral and inguinal hernia and removal of mesh causing vascular and neuro symptoms.   Patient admitted postop c/o vomiting, pain , unable to eat.         IMP;    Dehydration             nausea             poor pain management - patient did not take pain meds             lower extremity venous doppler negative for DVT             afebrile             CT  of abd /pelvis - post surgical changes           Discussed with Dr Ramos     Plan:   admit to medicine            Iv fluids             diet as tolerated             pain management             eliquis             zofran for nausea            re evaluate in am

## 2023-04-15 NOTE — H&P ADULT - NSHPREVIEWOFSYSTEMS_GEN_ALL_CORE
CONSTITUTIONAL: denies fever, chills, fatigue, admits to weakness  HEENT: denies blurred vision, sore throat  CARDIOVASCULAR: denies chest pain, chest pressure, palpitations  RESPIRATORY: denies shortness of breath, sputum production  GASTROINTESTINAL: admits to nausea and abdominal pain, denies vomiting, diarrhea, abdominal pain  GENITOURINARY: denies dysuria, discharge  NEUROLOGICAL: denies numbness, headache  MUSCULOSKELETAL: denies new joint pain, muscle aches  HEMATOLOGIC: denies gross bleeding, bruising  LYMPHATICS: denies enlarged lymph nodes, extremity swelling  PSYCHIATRIC: denies recent changes in anxiety, depression  ENDOCRINOLOGIC: denies sweating, cold or heat intolerance

## 2023-04-15 NOTE — H&P ADULT - NSHPPHYSICALEXAM_GEN_ALL_CORE
T(C): 36.6 (04-15-23 @ 12:19), Max: 36.6 (04-15-23 @ 12:19)  HR: 72 (04-15-23 @ 12:19) (72 - 72)  BP: 123/74 (04-15-23 @ 12:19) (123/74 - 123/74)  RR: 20 (04-15-23 @ 12:19) (20 - 20)  SpO2: 96% (04-15-23 @ 12:19) (96% - 96%)    GENERAL: patient appears well, no acute distress  EYES: sclera clear, no exudates  ENMT: oropharynx clear without erythema, no exudates, moist mucous membranes  NECK: supple, soft, no thyromegaly noted  LUNGS: good air entry bilaterally, clear to auscultation, symmetric breath sounds, no wheezing or rhonchi appreciated  HEART: soft S1/S2, regular rate and rhythm, no murmurs noted, no lower extremity edema  GASTROINTESTINAL: abdomen is soft, diffuse throughout, nondistended, normoactive bowel sounds, no palpable masses  INTEGUMENT: warm and perfused  MUSCULOSKELETAL: no clubbing or cyanosis, no obvious deformity  NEUROLOGIC: awake, alert, oriented x3, good muscle tone in 4 extremities  PSYCHIATRIC: mood is good, affect is congruent, linear and logical thought process

## 2023-04-15 NOTE — ED ADULT TRIAGE NOTE - CHIEF COMPLAINT QUOTE
Pt c/o of syncope 1 hour prior to arrival. Patient had hernia repair surgery yesterday here with Dr Lopes. Dr Lopes is aware and told patient to come to ED. Patient denies fevers, complains of pain at surgical site. Patient does not remember syncopal episode, however, as per sister patient did not hit head.

## 2023-04-15 NOTE — ED PROVIDER NOTE - NS ED MD DISPO DIVISION
[address] [place] [address] [facility] EMERGENCY DEPT 
[address] [facility] [address] 
[phone] Work/School Note Date: 9/21/2017 To Whom It May concern: 
 
Aarti Damian was seen and treated today in the emergency room by the following provider(s): 
Attending Provider: Grant Coughlin MD 
Physician Assistant: Friddie Mcardle, PA-C. Darryle T Mock may return to work on 9/23/17. Sincerely, Friddie Mcardle, PA-C 
 
 
 

[place]

## 2023-04-15 NOTE — H&P ADULT - ASSESSMENT
67 year old F PMH HTN, inguinal hernia s/p repair with mesh 1/13/2023 with removal of mesh and open repair of hernia, DVT (after surgery) RLE now on Eliquis presented to the ED with abdominal pain without vomiting admitted for abdominal pain likely post op related    #abdominal pain  - admit to medicine  - CT abd/pelvis without acute concerning findings, noted to have findings consistent with post op  - patient does not want to take opiate, will place on tramadol for pain control  - zofran PRN nausea  - will place on IVF for now, CLD and advance as tolerated  - discussed with surgery Ca MENDOZA- no surgical intervention at this time, will continue to monitor    #syncope  - possibly due to dehydration  - monitor on tele for arrhythmias  - follow up orthostatics  - continue IVF    #DVT RLE  - occured s/p hernia with mesh repair in 1/2023, due to R femoral nerve compression  - patient on Eliquis 5mg BID, will continue  - follow up doppler     #HTN  - continue home lisinopril    #DVT ppx  - on Eliquis    patient states she will update family on her own       67 year old F PMH HTN, inguinal hernia s/p repair with mesh 1/13/2023 with removal of mesh and open repair of hernia, DVT (after surgery) RLE now on Eliquis presented to the ED with abdominal pain without vomiting admitted for abdominal pain likely post op related    #abdominal pain  - admit to medicine  - CT abd/pelvis without acute concerning findings, noted to have findings consistent with post op  - patient does not want to take opiate, will place on tramadol for pain control  - zofran PRN nausea  - will place on IVF for now, CLD and advance as tolerated  - discussed with surgery Ca MENDOZA- no surgical intervention at this time, will continue to monitor    #syncope  - possibly due to dehydration  - monitor on tele for arrhythmias  - follow up orthostatics  - continue IVF    #DVT RLE  - occurred s/p hernia with mesh repair in 1/2023, due to R femoral nerve compression, clot provoked and built on mesh however states that after mesh repair, clot was also removed and vein "re-opened"  - patient on Eliquis 5mg BID, has almost completed 3 month course  - restarted Eliquis post op due to history of provoked DVT  - follow up doppler- consider stopping AC if negative    #HTN  - prescribed for lisinopril 7.5mg daily however states BP at home has been low so taking 1/2 tab in AM and qhs  - will continue with lisinopril 2.5mg BID    #DVT ppx  - on Eliquis    patient states she will update family on her own

## 2023-04-15 NOTE — ED PROVIDER NOTE - PHYSICAL EXAMINATION
VITAL SIGNS: I have reviewed nursing notes and confirm.  CONSTITUTIONAL: well-appearing, nonoxic  SKIN: Warm dry, normal skin turgor  HEAD: NCAT  EYES: EOMI, PERRLA, no scleral icterus  ENT: dry mucous membranes, normal pharynx  NECK: Supple; non tender. Full ROM.   CARD: RRR, no murmurs, rubs or gallops  RESP: clear to ausculation b/l.  No rales, rhonchi, or wheezing.  ABD: soft, + BS, diffusely tender, non-distended   EXT: Full ROM, no bony tenderness, no pedal edema, no calf tenderness  NEURO: normal motor. normal sensory. CN II-XII intact. Cerebellar testing normal.   PSYCH: Cooperative, appropriate.

## 2023-04-16 LAB
ALBUMIN SERPL ELPH-MCNC: 3.1 G/DL — LOW (ref 3.3–5)
ALP SERPL-CCNC: 63 U/L — SIGNIFICANT CHANGE UP (ref 40–120)
ALT FLD-CCNC: 31 U/L — SIGNIFICANT CHANGE UP (ref 10–45)
ANION GAP SERPL CALC-SCNC: 6 MMOL/L — SIGNIFICANT CHANGE UP (ref 5–17)
AST SERPL-CCNC: 29 U/L — SIGNIFICANT CHANGE UP (ref 10–40)
BASOPHILS # BLD AUTO: 0.06 K/UL — SIGNIFICANT CHANGE UP (ref 0–0.2)
BASOPHILS NFR BLD AUTO: 0.8 % — SIGNIFICANT CHANGE UP (ref 0–2)
BILIRUB SERPL-MCNC: 0.6 MG/DL — SIGNIFICANT CHANGE UP (ref 0.2–1.2)
BUN SERPL-MCNC: 8 MG/DL — SIGNIFICANT CHANGE UP (ref 7–23)
CALCIUM SERPL-MCNC: 9.4 MG/DL — SIGNIFICANT CHANGE UP (ref 8.4–10.5)
CHLORIDE SERPL-SCNC: 106 MMOL/L — SIGNIFICANT CHANGE UP (ref 96–108)
CO2 SERPL-SCNC: 30 MMOL/L — SIGNIFICANT CHANGE UP (ref 22–31)
CREAT SERPL-MCNC: 0.6 MG/DL — SIGNIFICANT CHANGE UP (ref 0.5–1.3)
EGFR: 97 ML/MIN/1.73M2 — SIGNIFICANT CHANGE UP
EOSINOPHIL # BLD AUTO: 0.27 K/UL — SIGNIFICANT CHANGE UP (ref 0–0.5)
EOSINOPHIL NFR BLD AUTO: 3.7 % — SIGNIFICANT CHANGE UP (ref 0–6)
GLUCOSE SERPL-MCNC: 103 MG/DL — HIGH (ref 70–99)
HCT VFR BLD CALC: 40 % — SIGNIFICANT CHANGE UP (ref 34.5–45)
HCV AB S/CO SERPL IA: 0.11 S/CO — SIGNIFICANT CHANGE UP (ref 0–0.99)
HCV AB SERPL-IMP: SIGNIFICANT CHANGE UP
HGB BLD-MCNC: 13.4 G/DL — SIGNIFICANT CHANGE UP (ref 11.5–15.5)
IMM GRANULOCYTES NFR BLD AUTO: 0.3 % — SIGNIFICANT CHANGE UP (ref 0–0.9)
LYMPHOCYTES # BLD AUTO: 1.32 K/UL — SIGNIFICANT CHANGE UP (ref 1–3.3)
LYMPHOCYTES # BLD AUTO: 18.1 % — SIGNIFICANT CHANGE UP (ref 13–44)
MCHC RBC-ENTMCNC: 33.5 GM/DL — SIGNIFICANT CHANGE UP (ref 32–36)
MCHC RBC-ENTMCNC: 33.5 PG — SIGNIFICANT CHANGE UP (ref 27–34)
MCV RBC AUTO: 100 FL — SIGNIFICANT CHANGE UP (ref 80–100)
MONOCYTES # BLD AUTO: 0.69 K/UL — SIGNIFICANT CHANGE UP (ref 0–0.9)
MONOCYTES NFR BLD AUTO: 9.5 % — SIGNIFICANT CHANGE UP (ref 2–14)
NEUTROPHILS # BLD AUTO: 4.93 K/UL — SIGNIFICANT CHANGE UP (ref 1.8–7.4)
NEUTROPHILS NFR BLD AUTO: 67.6 % — SIGNIFICANT CHANGE UP (ref 43–77)
NRBC # BLD: 0 /100 WBCS — SIGNIFICANT CHANGE UP (ref 0–0)
PLATELET # BLD AUTO: 129 K/UL — LOW (ref 150–400)
POTASSIUM SERPL-MCNC: 4 MMOL/L — SIGNIFICANT CHANGE UP (ref 3.5–5.3)
POTASSIUM SERPL-SCNC: 4 MMOL/L — SIGNIFICANT CHANGE UP (ref 3.5–5.3)
PROT SERPL-MCNC: 6.3 G/DL — SIGNIFICANT CHANGE UP (ref 6–8.3)
RBC # BLD: 4 M/UL — SIGNIFICANT CHANGE UP (ref 3.8–5.2)
RBC # FLD: 11.4 % — SIGNIFICANT CHANGE UP (ref 10.3–14.5)
SODIUM SERPL-SCNC: 142 MMOL/L — SIGNIFICANT CHANGE UP (ref 135–145)
WBC # BLD: 7.29 K/UL — SIGNIFICANT CHANGE UP (ref 3.8–10.5)
WBC # FLD AUTO: 7.29 K/UL — SIGNIFICANT CHANGE UP (ref 3.8–10.5)

## 2023-04-16 PROCEDURE — 99232 SBSQ HOSP IP/OBS MODERATE 35: CPT

## 2023-04-16 RX ADMIN — LISINOPRIL 2.5 MILLIGRAM(S): 2.5 TABLET ORAL at 21:18

## 2023-04-16 RX ADMIN — ONDANSETRON 4 MILLIGRAM(S): 8 TABLET, FILM COATED ORAL at 15:53

## 2023-04-16 RX ADMIN — APIXABAN 5 MILLIGRAM(S): 2.5 TABLET, FILM COATED ORAL at 17:33

## 2023-04-16 RX ADMIN — APIXABAN 5 MILLIGRAM(S): 2.5 TABLET, FILM COATED ORAL at 05:15

## 2023-04-16 RX ADMIN — TRAMADOL HYDROCHLORIDE 25 MILLIGRAM(S): 50 TABLET ORAL at 06:35

## 2023-04-16 RX ADMIN — ONDANSETRON 4 MILLIGRAM(S): 8 TABLET, FILM COATED ORAL at 06:16

## 2023-04-16 RX ADMIN — TRAMADOL HYDROCHLORIDE 25 MILLIGRAM(S): 50 TABLET ORAL at 15:52

## 2023-04-16 RX ADMIN — TRAMADOL HYDROCHLORIDE 25 MILLIGRAM(S): 50 TABLET ORAL at 05:46

## 2023-04-16 RX ADMIN — LISINOPRIL 2.5 MILLIGRAM(S): 2.5 TABLET ORAL at 09:22

## 2023-04-16 NOTE — PROGRESS NOTE ADULT - ASSESSMENT
67 year old female with hx of htn, presents for repair of femoral and inguinal hernia and removal of mesh causing vascular and neuro symptoms.   Patient admitted postop c/o vomiting, pain , unable to eat.         Plan:  PT for ambulation  Advance diet          Discussed with Dr Ramos

## 2023-04-16 NOTE — PROGRESS NOTE ADULT - SUBJECTIVE AND OBJECTIVE BOX
Patient is a 67y old  Female who presents with a chief complaint of abdominal pain (16 Apr 2023 11:30)      INTERVAL History of Present Illness/OVERNIGHT EVENTS:    MEDICATIONS  (STANDING):  apixaban 5 milliGRAM(s) Oral every 12 hours  lisinopril 2.5 milliGRAM(s) Oral two times a day  sodium chloride 0.9%. 1000 milliLiter(s) (60 mL/Hr) IV Continuous <Continuous>    MEDICATIONS  (PRN):  acetaminophen     Tablet .. 650 milliGRAM(s) Oral every 6 hours PRN Temp greater or equal to 38C (100.4F), Mild Pain (1 - 3)  aluminum hydroxide/magnesium hydroxide/simethicone Suspension 30 milliLiter(s) Oral every 4 hours PRN Dyspepsia  melatonin 3 milliGRAM(s) Oral at bedtime PRN Insomnia  ondansetron Injectable 4 milliGRAM(s) IV Push every 8 hours PRN Nausea and/or Vomiting  traMADol 25 milliGRAM(s) Oral every 6 hours PRN Moderate Pain (4 - 6)  traMADol 50 milliGRAM(s) Oral every 6 hours PRN Severe Pain (7 - 10)      Allergies    contrast dye (Hives)  Compazine (Anaphylaxis)  epinephrine (Other)    Intolerances        REVIEW OF SYSTEMS:  Other systems currently negative unless otherwise specified above.    Vital Signs Last 24 Hrs  T(C): 36.7 (16 Apr 2023 04:58), Max: 37.2 (15 Apr 2023 21:28)  T(F): 98.1 (16 Apr 2023 04:58), Max: 98.9 (15 Apr 2023 21:28)  HR: 60 (16 Apr 2023 09:24) (60 - 80)  BP: 135/80 (16 Apr 2023 09:24) (128/72 - 135/80)  BP(mean): --  RR: 16 (16 Apr 2023 04:58) (16 - 17)  SpO2: 98% (16 Apr 2023 04:58) (97% - 98%)    Parameters below as of 16 Apr 2023 04:58  Patient On (Oxygen Delivery Method): room air        Height (cm): 165.1 (04-15 @ 16:43)    PHYSICAL EXAM:  GENERAL: No apparent distress, appears stated age  EYES: Conjunctiva and sclera clear, no discharge  ENMT: Moist mucous membranes, no nasal discharge  CHEST/LUNG: Clear to auscultation bilaterally, no wheeze or rales  HEART: Regular rhythm, no rubs or gallops  ABDOMEN: Soft, Nontender, Nondistended  EXTREMITIES:  No clubbing, cyanosis or edema  SKIN: No rash, no new discoloration      LABS:                        13.4   7.29  )-----------( 129      ( 16 Apr 2023 06:06 )             40.0     16 Apr 2023 06:06    142    |  106    |  8      ----------------------------<  103    4.0     |  30     |  0.60     Ca    9.4        16 Apr 2023 06:06    TPro  6.3    /  Alb  3.1    /  TBili  0.6    /  DBili  x      /  AST  29     /  ALT  31     /  AlkPhos  63     16 Apr 2023 06:06    PT/INR - ( 15 Apr 2023 12:50 )   PT: 14.4 sec;   INR: 1.24 ratio         PTT - ( 15 Apr 2023 12:50 )  PTT:25.5 sec    CAPILLARY BLOOD GLUCOSE        Height (cm): 165.1 (04-15 @ 16:43)    RADIOLOGY & ADDITIONAL TESTS:      Images reviewed personally    Consultant Notes Reviewed and Care Discussed with relevant Consultants.

## 2023-04-16 NOTE — DIETITIAN INITIAL EVALUATION ADULT - PERTINENT MEDS FT
MEDICATIONS  (STANDING):  apixaban 5 milliGRAM(s) Oral every 12 hours  lisinopril 2.5 milliGRAM(s) Oral two times a day  sodium chloride 0.9%. 1000 milliLiter(s) (60 mL/Hr) IV Continuous <Continuous>    MEDICATIONS  (PRN):  acetaminophen     Tablet .. 650 milliGRAM(s) Oral every 6 hours PRN Temp greater or equal to 38C (100.4F), Mild Pain (1 - 3)  aluminum hydroxide/magnesium hydroxide/simethicone Suspension 30 milliLiter(s) Oral every 4 hours PRN Dyspepsia  melatonin 3 milliGRAM(s) Oral at bedtime PRN Insomnia  ondansetron Injectable 4 milliGRAM(s) IV Push every 8 hours PRN Nausea and/or Vomiting  traMADol 25 milliGRAM(s) Oral every 6 hours PRN Moderate Pain (4 - 6)  traMADol 50 milliGRAM(s) Oral every 6 hours PRN Severe Pain (7 - 10)

## 2023-04-16 NOTE — DIETITIAN INITIAL EVALUATION ADULT - NS FNS DIET ORDER
Diet, Full Liquid (04-15-23 @ 18:32)  Diet, Regular (04-15-23 @ 18:31)  Diet, Clear Liquid (04-15-23 @ 15:16)

## 2023-04-16 NOTE — PROGRESS NOTE ADULT - ASSESSMENT
67 year old F PMH HTN, inguinal hernia s/p repair with mesh 1/13/2023 with removal of mesh and open repair of hernia, DVT (after surgery) RLE now on Eliquis presented to the ED with abdominal pain without vomiting admitted for abdominal pain likely post op related    #abdominal pain  - CT abd/pelvis without acute concerning findings, noted to have findings consistent with post op  - on tramadol for pain  - zofran PRN nausea  - s/p IVF, diet advanced   - discussed with surgery Ca MENDOZA- no surgical intervention at this time, will continue to monitor    #syncope  - likely due to dehydration and pain. Patient states she had not been eating well and pain was severe  - monitor on tele for arrhythmias  - follow up orthostatics  -s/p IVF    #DVT RLE  - occurred s/p hernia with mesh repair in 1/2023, due to R femoral nerve compression, clot provoked and built on mesh however states that after mesh repair, clot was also removed and vein "re-opened"  - patient on Eliquis 5mg BID, has almost completed 3 month course  - restarted Eliquis post op due to history of provoked DVT  -LE US neg    #HTN  - prescribed for lisinopril 7.5mg daily however states BP at home has been low so taking 1/2 tab in AM and qhs  - will continue with lisinopril 2.5mg BID    #DVT ppx  - on Eliquis    patient states she will update family on her own  Dispo: home likely tomorrow 67 year old F PMH HTN, inguinal hernia s/p repair with mesh 1/13/2023 with removal of mesh and open repair of hernia, DVT (after surgery) RLE now on Eliquis presented to the ED with abdominal pain without vomiting admitted for abdominal pain likely post op related    #abdominal pain  - CT abd/pelvis without acute concerning findings, noted to have findings consistent with post op  - on tramadol for pain  - zofran PRN nausea  - s/p IVF, diet advanced   - discussed with surgery Ca MENDOZA- no surgical intervention at this time, will continue to monitor    #syncope  - likely due to dehydration and pain. Patient states she had not been eating well and pain was severe  - monitor on tele for arrhythmias  - follow up orthostatics  -s/p IVF    #DVT RLE  - occurred s/p hernia with mesh repair in 1/2023, due to R femoral nerve compression, clot provoked and built on mesh however states that after mesh repair, clot was also removed and vein "re-opened"  - patient on Eliquis 5mg BID, has almost completed 3 month course. FU with henrik regarding continued duration.   - restarted Eliquis post op due to history of provoked DVT  -LE US neg    #HTN  - prescribed for lisinopril 7.5mg daily however states BP at home has been low so taking 1/2 tab in AM and qhs  - will continue with lisinopril 2.5mg BID    #DVT ppx  - on Eliquis    patient states she will update family on her own  Dispo: home likely tomorrow 67 year old F PMH HTN, inguinal hernia s/p repair with mesh 1/13/2023 with removal of mesh and open repair of hernia, DVT (after surgery) RLE now on Eliquis presented to the ED with abdominal pain without vomiting admitted for abdominal pain likely post op related    #abdominal pain  - CT abd/pelvis without acute concerning findings, noted to have findings consistent with post op  - on tramadol for pain  - zofran PRN nausea  - s/p IVF, diet advanced   - discussed with surgery Ca MENDOZA- no surgical intervention at this time, will continue to monitor    #syncope  - likely due to dehydration and pain. Patient states she had not been eating well and pain was severe  - monitor on tele for arrhythmias  - follow up orthostatics  -s/p IVF    #DVT RLE  - occurred s/p hernia with mesh repair in 1/2023, due to R femoral nerve compression, clot provoked and built on mesh however states that after mesh repair, clot was also removed and vein "re-opened"  - patient on Eliquis 5mg BID, has almost completed 3 month course. FU with henrik regarding continued duration.   - restarted Eliquis post op due to history of provoked DVT  -LE US neg    #HTN  - prescribed for lisinopril 7.5mg daily however states BP at home has been low so taking 1/2 tab in AM and qhs  - will continue with lisinopril 2.5mg BID    #DVT ppx  - on Eliquis    Left  for Jayla [phone]  patient states she will update family on her own  Dispo: home likely tomorrow

## 2023-04-16 NOTE — PROGRESS NOTE ADULT - SUBJECTIVE AND OBJECTIVE BOX
Patient is a 67y old  Female who presents with a chief complaint of abdominal pain (16 Apr 2023 13:51)      Patient seen and examined at bedside. abdominal pain better controlled today. Denies complaints.     ALLERGIES:  contrast dye (Hives)  Compazine (Anaphylaxis)  epinephrine (Other)    MEDICATIONS  (STANDING):  apixaban 5 milliGRAM(s) Oral every 12 hours  lisinopril 2.5 milliGRAM(s) Oral two times a day  sodium chloride 0.9%. 1000 milliLiter(s) (60 mL/Hr) IV Continuous <Continuous>    MEDICATIONS  (PRN):  acetaminophen     Tablet .. 650 milliGRAM(s) Oral every 6 hours PRN Temp greater or equal to 38C (100.4F), Mild Pain (1 - 3)  aluminum hydroxide/magnesium hydroxide/simethicone Suspension 30 milliLiter(s) Oral every 4 hours PRN Dyspepsia  melatonin 3 milliGRAM(s) Oral at bedtime PRN Insomnia  ondansetron Injectable 4 milliGRAM(s) IV Push every 8 hours PRN Nausea and/or Vomiting  traMADol 25 milliGRAM(s) Oral every 6 hours PRN Moderate Pain (4 - 6)  traMADol 50 milliGRAM(s) Oral every 6 hours PRN Severe Pain (7 - 10)    Vital Signs Last 24 Hrs  T(F): 98.1 (16 Apr 2023 04:58), Max: 98.9 (15 Apr 2023 21:28)  HR: 60 (16 Apr 2023 09:24) (60 - 80)  BP: 135/80 (16 Apr 2023 09:24) (128/72 - 135/80)  RR: 16 (16 Apr 2023 04:58) (16 - 17)  SpO2: 98% (16 Apr 2023 04:58) (97% - 98%)  I&O's Summary    15 Apr 2023 07:01  -  16 Apr 2023 07:00  --------------------------------------------------------  IN: 780 mL / OUT: 850 mL / NET: -70 mL      PHYSICAL EXAM:  General: NAD, A/O x 3  ENT: MMM, no oral thrush   Neck: Supple, No JVD  Lungs: Clear to auscultation bilaterally, non labored breathing  Cardio: RRR, S1/S2, No murmurs  Abdomen: incision site clean dry intact, no erythema, Soft, tender at site , Nondistended; Bowel sounds present  Extremities: No calf tenderness, No pitting edema    LABS:                        13.4   7.29  )-----------( 129      ( 16 Apr 2023 06:06 )             40.0     04-16    142  |  106  |  8   ----------------------------<  103  4.0   |  30  |  0.60    Ca    9.4      16 Apr 2023 06:06    TPro  6.3  /  Alb  3.1  /  TBili  0.6  /  DBili  x   /  AST  29  /  ALT  31  /  AlkPhos  63  04-16      PT/INR - ( 15 Apr 2023 12:50 )   PT: 14.4 sec;   INR: 1.24 ratio         PTT - ( 15 Apr 2023 12:50 )  PTT:25.5 sec    CARDIAC MARKERS ( 15 Apr 2023 12:50 )  x     / 8.6 ng/L / x     / x     / x                      Culture - Fungal, Other (collected 13 Apr 2023 14:00)  Source: .Other Culture of Femoral Canal  Preliminary Report (15 Apr 2023 15:03):    Culture is being performed. Fungal cultures are held for 4 weeks.    Culture - Surgical Swab (collected 13 Apr 2023 14:00)  Source: .Surgical Swab Culture of Femoral Canal  Preliminary Report (14 Apr 2023 17:57):    No growth          RADIOLOGY & ADDITIONAL TESTS:  < from: US Duplex Venous Lower Ext Ltd, Right (04.15.23 @ 15:18) >  IMPRESSION:  No evidence of right lower extremity deep venous thrombosis.          --- End of Report ---            MITCHELL STAKR MD; AttendingRadiologist  This document has been electronically signed. Apr 15 2023  3:40PM    < end of copied text >  < from: CT Abdomen and Pelvis No Cont (04.15.23 @ 13:47) >    IMPRESSION:    Markedly limited noncontrast study.    No small or large bowel significant distention. Bowel loops in the pelvis   are incompletely characterized due to absence of contrast and paucity of   intrapelvic fat.    Small dependent pelvic intraperitoneal fluid of unclear etiology. Also,   there is air and fluid in the dependent anterior pelvis adjacent to the   right inguinal region, of unclear significance. Of note, there is a 4.0 x   1.6 cm ovoid intrapelvic low-density structure along the right inguinal   region with adjacent mass effect on the urinary bladder, image 82 series   2. Postop changes can be considered. Associated superimposed infection or   developing collection is not excluded.    Considerable right lower abdominal wall and inguinal air/fluid along the   superficial soft tissues adjacent adjacent to postsurgical material.   Correlate clinically.    Recommend correlation with surgical history and IV/oral contrast CT for   further evaluation.    --- End of Report ---            ANURADHA MICHAEL M.D., ATTENDING RADIOLOGIST  This document has been electronically signed. Apr 15 2023  2:18PM    < end of copied text >    Care Discussed with Consultants/Other Providers:

## 2023-04-16 NOTE — DIETITIAN NUTRITION RISK NOTIFICATION - TREATMENT: THE FOLLOWING DIET HAS BEEN RECOMMENDED
Diet, Regular (04-15-23 @ 18:31) [Available for Activation]  Diet, Clear Liquid (04-15-23 @ 15:16) [Active]

## 2023-04-16 NOTE — DIETITIAN INITIAL EVALUATION ADULT - OTHER INFO
67 year old F PMH HTN, inguinal hernia s/p repair with mesh 1/13/2023 with removal of mesh and open repair of hernia, DVT (after surgery) RLE now on Eliquis presented to the ED with abdominal pain without vomiting admitted for abdominal pain likely post op related.   Pt tolerating clear liquids. Pain managed with tramadol. Pt endorses ~20lbs weight loss within 6 months, indicates 17% weight change. Observed with severe muscle wasting/fat loss. Pt states that she feels she is capable of regaining the lost weight per returning to her typical way of eating once her medical issues have resolved. Recommend advancement to regular diet as tolerated per surgery discretion.

## 2023-04-16 NOTE — DIETITIAN INITIAL EVALUATION ADULT - PERTINENT LABORATORY DATA
04-16    142  |  106  |  8   ----------------------------<  103<H>  4.0   |  30  |  0.60    Ca    9.4      16 Apr 2023 06:06    TPro  6.3  /  Alb  3.1<L>  /  TBili  0.6  /  DBili  x   /  AST  29  /  ALT  31  /  AlkPhos  63  04-16

## 2023-04-16 NOTE — PROGRESS NOTE ADULT - SUBJECTIVE AND OBJECTIVE BOX
INTERVAL HPI/OVERNIGHT EVENTS:  Patient seen, no complaints.  Says she want to get up and walk.  she is hungry, not nauseous, no vomiting, would like her diet advanced.  Pain improved, admits flatus, no BM yet.      MEDICATIONS  (STANDING):  apixaban 5 milliGRAM(s) Oral every 12 hours  lisinopril 2.5 milliGRAM(s) Oral two times a day  sodium chloride 0.9%. 1000 milliLiter(s) (60 mL/Hr) IV Continuous <Continuous>    MEDICATIONS  (PRN):  acetaminophen     Tablet .. 650 milliGRAM(s) Oral every 6 hours PRN Temp greater or equal to 38C (100.4F), Mild Pain (1 - 3)  aluminum hydroxide/magnesium hydroxide/simethicone Suspension 30 milliLiter(s) Oral every 4 hours PRN Dyspepsia  melatonin 3 milliGRAM(s) Oral at bedtime PRN Insomnia  ondansetron Injectable 4 milliGRAM(s) IV Push every 8 hours PRN Nausea and/or Vomiting  traMADol 25 milliGRAM(s) Oral every 6 hours PRN Moderate Pain (4 - 6)  traMADol 50 milliGRAM(s) Oral every 6 hours PRN Severe Pain (7 - 10)      Allergies    contrast dye (Hives)  Compazine (Anaphylaxis)  epinephrine (Other)    Vital Signs Last 24 Hrs  T(C): 36.7 (16 Apr 2023 04:58), Max: 37.2 (15 Apr 2023 21:28)  T(F): 98.1 (16 Apr 2023 04:58), Max: 98.9 (15 Apr 2023 21:28)  HR: 60 (16 Apr 2023 09:24) (60 - 80)  BP: 135/80 (16 Apr 2023 09:24) (123/74 - 135/80)  BP(mean): --  RR: 16 (16 Apr 2023 04:58) (16 - 20)  SpO2: 98% (16 Apr 2023 04:58) (96% - 98%)    Parameters below as of 16 Apr 2023 04:58  Patient On (Oxygen Delivery Method): room air    PE: Lying comfortably in bed, NAD  pulm: CTA   Cardiac: S1, S2  Abd: soft, incision clean and dry, no erythema, no discharge.  Calves soft, NT.    LABS:                        13.4   7.29  )-----------( 129      ( 16 Apr 2023 06:06 )             40.0     04-16    142  |  106  |  8   ----------------------------<  103<H>  4.0   |  30  |  0.60    Ca    9.4      16 Apr 2023 06:06    TPro  6.3  /  Alb  3.1<L>  /  TBili  0.6  /  DBili  x   /  AST  29  /  ALT  31  /  AlkPhos  63  04-16    PT/INR - ( 15 Apr 2023 12:50 )   PT: 14.4 sec;   INR: 1.24 ratio         PTT - ( 15 Apr 2023 12:50 )  PTT:25.5 sec      RADIOLOGY & ADDITIONAL TESTS:

## 2023-04-17 ENCOUNTER — TRANSCRIPTION ENCOUNTER (OUTPATIENT)
Age: 68
End: 2023-04-17

## 2023-04-17 DIAGNOSIS — I82.409 ACUTE EMBOLISM AND THROMBOSIS OF UNSPECIFIED DEEP VEINS OF UNSPECIFIED LOWER EXTREMITY: ICD-10-CM

## 2023-04-17 PROCEDURE — 99232 SBSQ HOSP IP/OBS MODERATE 35: CPT

## 2023-04-17 PROCEDURE — 99222 1ST HOSP IP/OBS MODERATE 55: CPT

## 2023-04-17 RX ADMIN — APIXABAN 5 MILLIGRAM(S): 2.5 TABLET, FILM COATED ORAL at 19:28

## 2023-04-17 RX ADMIN — APIXABAN 5 MILLIGRAM(S): 2.5 TABLET, FILM COATED ORAL at 05:21

## 2023-04-17 RX ADMIN — LISINOPRIL 2.5 MILLIGRAM(S): 2.5 TABLET ORAL at 21:20

## 2023-04-17 RX ADMIN — LISINOPRIL 2.5 MILLIGRAM(S): 2.5 TABLET ORAL at 09:26

## 2023-04-17 NOTE — PHYSICAL THERAPY INITIAL EVALUATION ADULT - ADDITIONAL COMMENTS
pt lives alone in private house in [place], independent w/ambulation and all ADL's. pt in [place] for hernia surgery revision, staying at [facility] w/sister

## 2023-04-17 NOTE — CONSULT NOTE ADULT - PROBLEM SELECTOR RECOMMENDATION 9
Patient with RLE DVT provoked by prior surgery. With continued relative immobility (per patient), and plans for travel, suggest continuing anticoagulation for now. Patient to f/u with her PCP once home, for further decisions, pending her status at that time. To watch for s/sx. of bleeding. Patient concurs with plans.

## 2023-04-17 NOTE — DISCHARGE NOTE PROVIDER - CARE PROVIDER_API CALL
PCP out of state,   Phone: (   )    -  Fax: (   )    -  Follow Up Time:    PCP out of state,   Phone: (   )    -  Fax: (   )    -  Follow Up Time:     Celsete Ramos)  Surgery  [address] [place] [address]  Phone: [phone]  Fax: [phone]  Follow Up Time:

## 2023-04-17 NOTE — PROGRESS NOTE ADULT - SUBJECTIVE AND OBJECTIVE BOX
INTERVAL HPI/OVERNIGHT EVENTS:  Pt seen and examined at bedside. No acute events reported overnight. Pt complaining of feeling deconditioned since the surgery. States she does not feel ready to fly back home tomorrow. Endorses persistent numbness in the R foot. Reports no BM. Tolerating clears, does not want to eat solids until has a BM. Denies fever/chills, chest pain, dyspnea, cough, dizziness, n/v.     Vital Signs Last 24 Hrs  T(C): 36.7 (17 Apr 2023 05:09), Max: 36.8 (16 Apr 2023 21:13)  T(F): 98.1 (17 Apr 2023 05:09), Max: 98.2 (16 Apr 2023 21:13)  HR: 61 (17 Apr 2023 09:28) (61 - 76)  BP: 123/75 (17 Apr 2023 09:28) (123/75 - 132/83)  BP(mean): --  RR: 15 (17 Apr 2023 09:28) (15 - 16)  SpO2: 98% (17 Apr 2023 09:28) (95% - 98%)    Parameters below as of 17 Apr 2023 09:28  Patient On (Oxygen Delivery Method): room air        PHYSICAL EXAM:  GENERAL: awake and alert, NAD  HEAD: Atraumatic, Normocephalic  EYES: EOMI, PERRLA, conjunctiva and sclera clear  HEART: normal HR  ABDOMEN: soft, non tender, non-distended; surgical site C/D/I with no ecchymosis or hematoma, covered w/ skin glue   EXTREMITIES: calf soft, non-tender b/l    I&O's Detail      LABS:                        13.4   7.29  )-----------( 129      ( 16 Apr 2023 06:06 )             40.0     04-16    142  |  106  |  8   ----------------------------<  103<H>  4.0   |  30  |  0.60    Ca    9.4      16 Apr 2023 06:06    TPro  6.3  /  Alb  3.1<L>  /  TBili  0.6  /  DBili  x   /  AST  29  /  ALT  31  /  AlkPhos  63  04-16

## 2023-04-17 NOTE — DISCHARGE NOTE PROVIDER - NSDCCPCAREPLAN_GEN_ALL_CORE_FT
PRINCIPAL DISCHARGE DIAGNOSIS  Diagnosis: Abdominal pain  Assessment and Plan of Treatment:       SECONDARY DISCHARGE DIAGNOSES  Diagnosis: DVT, lower extremity  Assessment and Plan of Treatment:      PRINCIPAL DISCHARGE DIAGNOSIS  Diagnosis: Abdominal pain  Assessment and Plan of Treatment: -probably secondary to Hernia surgery      SECONDARY DISCHARGE DIAGNOSES  Diagnosis: DVT, lower extremity  Assessment and Plan of Treatment: -continue anticoagulation; Eliquis    Diagnosis: History of hernia repair  Assessment and Plan of Treatment: -follow up with your Surgeon as directed by him  -if the surgical wound appears very red, swollen, painful and has any drainage call your Surgeon for an evaluation     PRINCIPAL DISCHARGE DIAGNOSIS  Diagnosis: Abdominal pain  Assessment and Plan of Treatment: -probably secondary to Hernia surgery  follow up with Dr Nur upon Discharge from rehab ctr      SECONDARY DISCHARGE DIAGNOSES  Diagnosis: DVT, lower extremity  Assessment and Plan of Treatment: -continue anticoagulation; Eliquis    Diagnosis: History of hernia repair  Assessment and Plan of Treatment: -follow up with your Surgeon as directed by him  -if the surgical wound appears very red, swollen, painful and has any drainage call your Surgeon for an evaluation

## 2023-04-17 NOTE — DISCHARGE NOTE PROVIDER - NSDCMRMEDTOKEN_GEN_ALL_CORE_FT
Eliquis 5 mg oral tablet: 1 tablet orally 2 times a day may resume eliquis  in 24 hours may resume tomorrow night   lisinopril 7.5mg po daily:   ondansetron 4 mg oral tablet, disintegratin tab(s) orally 3 times a day MDD: 3  oxyCODONE 5 mg oral tablet: 1 tab(s) orally every 4 hours as needed for  moderate pain MDD: 6   apixaban 5 mg oral tablet: 1 tab(s) orally every 12 hours  lisinopril 2.5 mg oral tablet: 1 tab(s) orally 2 times a day

## 2023-04-17 NOTE — CONSULT NOTE ADULT - ASSESSMENT
67 year old F PMH HTN, inguinal hernia s/p repair with mesh 1/13/2023 with removal of mesh and open repair of hernia, DVT (after surgery) RLE now on Eliquis presented to the ED with abdominal pain without vomiting. Patient states she was discharged with Percocet but has not taken medication due to her feeling nauseous with it, took zofran 1 hour prior to arrival to ED. As per family, patient had near syncopal episode when she attempted to get out of chair but did not have LOC. Denies fever, chills, chest pain, sob, palpitations, diarrhea. Of note, patient had DVT RLE post op in Jan 2023 due to R femoral nerve compression now on Eliquis. She also states she has not been moving much at home since surgical repair.

## 2023-04-17 NOTE — PROGRESS NOTE ADULT - ASSESSMENT
67F w/ PMHx of HTN, s/p repair of femoral and inguinal hernia and removal of mesh causing vascular and neuro symptoms on 4/13. Patient admitted postop c/o vomiting, pain, unable to tolerate PO intake.  - No further surgical intervention at this time. Signing off and okay to discharge from surgical standpoint  - OOB, ambulate as tolerated  - Pain control PRN  - Recommend bowel regimen for constipation and hydration  - Medical management and supportive care as per primary team  - Please reconsult surgery with any questions or concerns  Plan discussed w/ Dr. Ramos

## 2023-04-17 NOTE — PROGRESS NOTE ADULT - ASSESSMENT
67 year old F PMH HTN, inguinal hernia s/p repair with mesh 1/13/2023 with removal of mesh and open repair of hernia, DVT (after surgery) RLE now on Eliquis presented to the ED with abdominal pain.     #Abdominal pain; probably sec. to surgical wound  - CT abd/pelvis without acute concerning findings, noted to have findings consistent with post op wound  - cont pain mgmt, antiemetics prn  - diet was advanced; pt still not eating well  - per Surgery Ca MENDOZA- no surgical intervention at this time, will continue to monitor    #Syncope  - likely due to dehydration and pain. Patient states she had not been eating well and pain was severe  - no arrhythmias on tele  - follow up orthostatics; still pending    #DVT RLE  - occurred s/p hernia with mesh repair in 1/2023, due to R femoral nerve compression, clot provoked and built on mesh however states that after mesh repair, clot was also removed and vein "re-opened"  - patient on Eliquis 5mg BID, continue for now per Heme rec (at least for 2-3 wks then can discuss with her PCP at home in [place])  - LE US neg for DVT    #HTN  - prescribed for lisinopril 7.5mg daily however states BP at home has been low so taking 1/2 tab in AM and qhs  - will continue with lisinopril 2.5mg BID    #DVT ppx  - on Eliquis    Dispo:  anticipated D/C home in next 24-48 hrs, she will update family.

## 2023-04-17 NOTE — PHYSICAL THERAPY INITIAL EVALUATION ADULT - PERTINENT HX OF CURRENT PROBLEM, REHAB EVAL
67 year old F PMH HTN, inguinal hernia s/p repair with mesh 1/13/2023 with removal of mesh and open repair of hernia, DVT (after surgery) RLE now on Eliquis presented to the ED with abdominal pain without vomiting

## 2023-04-17 NOTE — DISCHARGE NOTE PROVIDER - PROVIDER TOKENS
FREE:[LAST:[PCP out of state],PHONE:[(   )    -],FAX:[(   )    -]] FREE:[LAST:[PCP out of state],PHONE:[(   )    -],FAX:[(   )    -]],PROVIDER:[TOKEN:[[id number]]]

## 2023-04-17 NOTE — DISCHARGE NOTE PROVIDER - DETAILS OF MALNUTRITION DIAGNOSIS/DIAGNOSES
This patient has been assessed with a concern for Malnutrition and was treated during this hospitalization for the following Nutrition diagnosis/diagnoses:     -  04/16/2023: Severe protein-calorie malnutrition   -  04/16/2023: Underweight (BMI < 19)

## 2023-04-17 NOTE — PROGRESS NOTE ADULT - SUBJECTIVE AND OBJECTIVE BOX
Patient is a 67y old  Female who presents with a chief complaint of abdominal pain (17 Apr 2023 09:01)      Patient seen and examined at bedside. No overnight events reported. Pt c/o feeling very weak, difficulty walking.  She also states her skin is "itchy" around the wound.    ALLERGIES:  contrast dye (Hives)  Compazine (Anaphylaxis)  epinephrine (Other)    MEDICATIONS  (STANDING):  apixaban 5 milliGRAM(s) Oral every 12 hours  lisinopril 2.5 milliGRAM(s) Oral two times a day  sodium chloride 0.9%. 1000 milliLiter(s) (60 mL/Hr) IV Continuous <Continuous>    MEDICATIONS  (PRN):  acetaminophen     Tablet .. 650 milliGRAM(s) Oral every 6 hours PRN Temp greater or equal to 38C (100.4F), Mild Pain (1 - 3)  aluminum hydroxide/magnesium hydroxide/simethicone Suspension 30 milliLiter(s) Oral every 4 hours PRN Dyspepsia  melatonin 3 milliGRAM(s) Oral at bedtime PRN Insomnia  ondansetron Injectable 4 milliGRAM(s) IV Push every 8 hours PRN Nausea and/or Vomiting  traMADol 50 milliGRAM(s) Oral every 6 hours PRN Severe Pain (7 - 10)  traMADol 25 milliGRAM(s) Oral every 6 hours PRN Moderate Pain (4 - 6)    Vital Signs Last 24 Hrs  T(F): 98.1 (17 Apr 2023 05:09), Max: 98.2 (16 Apr 2023 21:13)  HR: 61 (17 Apr 2023 09:28) (61 - 76)  BP: 123/75 (17 Apr 2023 09:28) (123/75 - 132/83)  RR: 15 (17 Apr 2023 09:28) (15 - 16)  SpO2: 98% (17 Apr 2023 09:28) (95% - 98%)  I&O's Summary    PHYSICAL EXAM:  General: NAD, very thin appearing.  ENT: No gross hearing impairment, Moist mucous membranes, no thrush  Neck: Supple, No JVD  Lungs: Clear to auscultation bilaterally, good air entry, non-labored breathing  Cardio: RRR, S1/S2, No murmur  Abdomen: Soft, Nondistended, tender at right lower quadrant, wound is clean and dry, mild erythema (from scratching) and BS are hypoactive  Extremities: No calf tenderness, No cyanosis, No pitting edema  Psych: Appropriate mood and affect    LABS:                        13.4   7.29  )-----------( 129      ( 16 Apr 2023 06:06 )             40.0     04-16    142  |  106  |  8   ----------------------------<  103  4.0   |  30  |  0.60    Ca    9.4      16 Apr 2023 06:06    TPro  6.3  /  Alb  3.1  /  TBili  0.6  /  DBili  x   /  AST  29  /  ALT  31  /  AlkPhos  63  04-16          PT/INR - ( 15 Apr 2023 12:50 )   PT: 14.4 sec;   INR: 1.24 ratio         PTT - ( 15 Apr 2023 12:50 )  PTT:25.5 sec      CARDIAC MARKERS ( 15 Apr 2023 12:50 )  x     / 8.6 ng/L / x     / x     / x                                Culture - Fungal, Other (collected 13 Apr 2023 14:00)  Source: .Other Culture of Femoral Canal  Preliminary Report (15 Apr 2023 15:03):    Culture is being performed. Fungal cultures are held for 4 weeks.    Culture - Surgical Swab (collected 13 Apr 2023 14:00)  Source: .Surgical Swab Culture of Femoral Canal  Preliminary Report (14 Apr 2023 17:57):    No growth        RADIOLOGY & ADDITIONAL TESTS:    Care Discussed with Consultants/Other Providers:

## 2023-04-17 NOTE — CONSULT NOTE ADULT - SUBJECTIVE AND OBJECTIVE BOX
LISA KAY  67y  Female  Admitting: MIGDALIA Ramirez    HPI:  67 year old F PMH HTN, inguinal hernia s/p repair with mesh 1/13/2023 with removal of mesh and open repair of hernia, DVT (after surgery) RLE now on Eliquis presented to the ED with abdominal pain without vomiting. Patient states she was discharged with Percocet but has not taken medication due to her feeling nauseous with it, took zofran 1 hour prior to arrival to ED. As per family, patient had near syncopal episode when she attempted to get out of chair but did not have LOC. Denies fever, chills, chest pain, sob, palpitations, diarrhea. Of note, patient had DVT RLE post op in Jan 2023 due to R femoral nerve compression now on Eliquis. She also states she has not been moving much at home since surgical repair.    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 1000 milliLiter(s) (60 mL/Hr) IV Continuous <Continuous>    MEDICATIONS  (PRN):  acetaminophen     Tablet .. 650 milliGRAM(s) Oral every 6 hours PRN Temp greater or equal to 38C (100.4F), Mild Pain (1 - 3)  aluminum hydroxide/magnesium hydroxide/simethicone Suspension 30 milliLiter(s) Oral every 4 hours PRN Dyspepsia  melatonin 3 milliGRAM(s) Oral at bedtime PRN Insomnia  ondansetron Injectable 4 milliGRAM(s) IV Push every 8 hours PRN Nausea and/or Vomiting  traMADol 50 milliGRAM(s) Oral every 6 hours PRN Severe Pain (7 - 10)  traMADol 25 milliGRAM(s) Oral every 6 hours PRN Moderate Pain (4 - 6)    Allergies    contrast dye (Hives)  Compazine (Anaphylaxis)  epinephrine (Other)    FAMILY HISTORY:  No pertinent family history in first degree relatives; no FH of VTE.      SOCIAL HISTORY: No EtOH, no tobacco    REVIEW OF SYSTEMS:    CONSTITUTIONAL: No fevers or chills  EYES/ENT: No visual changes;  No vertigo or throat pain   NECK: No pain or stiffness  RESPIRATORY: No cough, wheezing, hemoptysis; No shortness of breath  CARDIOVASCULAR: No chest pain or palpitations  GASTROINTESTINAL: No hematemesis; No melena or hematochezia.  GENITOURINARY: No hematuria  NEUROLOGICAL: +weakness  SKIN: No itching   All other review of systems is negative unless indicated above.      T(F): 98.1 (04-17-23 @ 05:09), Max: 98.2 (04-16-23 @ 21:13)  HR: 76 (04-17-23 @ 05:09)  BP: 128/81 (04-17-23 @ 05:09)  RR: 16 (04-17-23 @ 05:09)  SpO2: 95% (04-17-23 @ 05:09)      GENERAL: NAD, well-developed  HEAD:  Atraumatic, Normocephalic  EYES: EOMI, PERRLA, conjunctiva and sclera clear  NECK: Supple, No JVD  CHEST/LUNG: Clear to auscultation ant.; No wheeze  HEART: Regular rate and rhythm; No murmurs, rubs, or gallops  ABDOMEN: Soft, Nontender, Nondistended; Bowel sounds present  EXTREMITIES:  no calf tenderness  NEUROLOGY: awake, alert      Labs:             13.4   7.29  )-----------( 129      ( 04-16 @ 06:06 )             40.0                13.0   10.11 )-----------( 146      ( 04-15 @ 12:50 )             37.9       04-16    142  |  106  |  8   ----------------------------<  103<H>  4.0   |  30  |  0.60    Ca    9.4      16 Apr 2023 06:06    TPro  6.3  /  Alb  3.1<L>  /  TBili  0.6  /  DBili  x   /  AST  29  /  ALT  31  /  AlkPhos  63  04-16      PT/INR - ( 15 Apr 2023 12:50 )   PT: 14.4 sec;   INR: 1.24 ratio         PTT - ( 15 Apr 2023 12:50 )  PTT:25.5 sec    Consultant notes reviewed : YES [x ] ; NO [ ]      Radiology and additional tests:  < from: US Duplex Venous Lower Ext Ltd, Right (04.15.23 @ 15:18) >  IMPRESSION:  No evidence of right lower extremity deep venous thrombosis.      < end of copied text >  < from: CT Abdomen and Pelvis No Cont (04.15.23 @ 13:47) >  IMPRESSION:    Markedly limited noncontrast study.    No small or large bowel significant distention. Bowel loops in the pelvis   are incompletely characterized due to absence of contrast and paucity of   intrapelvic fat.    Small dependent pelvic intraperitoneal fluid of unclear etiology. Also,   there is air and fluid in the dependent anterior pelvis adjacent to the   right inguinal region, of unclear significance. Of note, there is a 4.0 x   1.6 cm ovoid intrapelvic low-density structure along the right inguinal   region with adjacent mass effect on the urinary bladder, image 82 series   2. Postop changes can be considered. Associated superimposed infection or   developing collection is not excluded.    Considerable right lower abdominal wall and inguinal air/fluid along the   superficial soft tissues adjacent adjacent to postsurgical material.   Correlate clinically.    Recommend correlation with surgical history and IV/oral contrast CT for   further evaluation.      < end of copied text >  
HPI:  67 year old F PMH HTN, inguinal hernia s/p repair with mesh 1/13/2023 with removal of mesh and open repair of hernia, DVT (after surgery) RLE now on Eliquis presented to the ED with abdominal pain without vomiting. Patient states she was discharged with Percocet but has not taken medication due to her feeling nauseous with it, took zofran 1 hour prior to arrival to ED. As per family, patient had near syncopal episode when she attempted to get out of chair but did not have LOC. At this time, patient continues to have abd pain and nausea. Denies fever, chills, chest pain, sob, palpitations, diarrhea. Of note, patient had DVT RLE post op in Jan 2023 due to R femoral nerve compression now on Eliquis. She also states she has not been moving much at home since surgical repair.    In the ED, T 97.9F, HR 72, /74, RR 20, SpO2 96% on RA. Labs showed normal CBC and CMP, trop negative, PT/INR 14.4/1.24, COVID/RVP negative. Received zofran 4mg IVP x1, tramadol 25mg POx1, and NS bolus x1L in the ED.  ontrast and paucity of intrapelvic fat. Small dependent pelvic intraperitoneal fluid of unclear etiology. Also, there is air and fluid in the dependent anterior pelvis adjacent to the right inguinal region, of unclear significance. Of note, there is a 4.0 x 1.6 cm ovoid intrapelvic low-density structure along the right inguinal region with adjacent mass effect on the urinary bladder, image 82 series 2. Postop changes can be considered. Associated superimposed infection or developing collection is not excluded. Considerable right lower abdominal wall and inguinal air/fluid along the superficial soft tissues adjacent adjacent to postsurgical material. (15 Apr 2023 14:35)    Patient was seen and examined by me this afternoon .   Patient underwent repair of right inguinal hernia and femoral hernia s/p removal of lodged, embedded mesh plug .    Patient is still c/o pain in the groin but she was only taking tylenol for pain.   She also felt guilherme she pulled something when her brother in law helped her get up .   There was no evidence of any damage to repair.   No ecchymosis or swelling noted, incision clean and dry.   Ovoid structure seen on Ct probably surgi-sheri and surgi pro utilized for prevention of bleeding.    Discussed plan with Dr Ramos .      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   c/o heaviness along thigh + neurovascular intact and no DVT   ext:  w/o edema , w/o calf pain                           13.0   10.11 )-----------( 146      ( 15 Apr 2023 12:50 )             37.9   04-15    142  |  106  |  12  ----------------------------<  107<H>  4.0   |  28  |  0.77    Ca    9.1      15 Apr 2023 12:50    TPro  6.0  /  Alb  3.2<L>  /  TBili  0.5  /  DBili  x   /  AST  31  /  ALT  28  /  AlkPhos  55  04-15

## 2023-04-17 NOTE — DISCHARGE NOTE PROVIDER - HOSPITAL COURSE
Hospital Course  67 year old F PMH HTN, inguinal hernia s/p repair with mesh 1/13/2023 with removal of mesh and open repair of hernia, DVT (after surgery) RLE now on Eliquis presented to the ED with abd pain.   She had a CT abd/pelvis without acute concerning findings, noted to have findings consistent with post op wound, she continued on cont pain mgmt, antiemetics prn.  Her diet was advanced; pt still not eating well due to poor appetite.  She was evla  - per Surgery Ca MENDOZA- no surgical intervention at this time, will continue to monitor    #Syncope  - likely due to dehydration and pain. Patient states she had not been eating well and pain was severe  - no arrhythmias on tele  - follow up orthostatics; still pending    #DVT RLE  - occurred s/p hernia with mesh repair in 1/2023, due to R femoral nerve compression, clot provoked and built on mesh however states that after mesh repair, clot was also removed and vein "re-opened"  - patient on Eliquis 5mg BID, continue for now per Heme rec (at least for 2-3 wks then can discuss with her PCP at home in [place])  - LE US neg for DVT    #HTN  - prescribed for lisinopril 7.5mg daily however states BP at home has been low so taking 1/2 tab in AM and qhs  - will continue with lisinopril 2.5mg BID    #DVT ppx  - on Eliquis    Dispo:  anticipated D/C home in next 24-48 hrs, she will update family.                  Palliative Care / Advanced Care Planning  Code Status:  Patient/Family agreeable to Hospice/Palliative (Y/N)?  Summary of Goals of Care Conversation:    Discharging Provider:  REJI Caballero  Contact Info: Cell [phone] - Please call with any questions or concerns.    Outpatient Provider:    Signout given to  SNF Provider:       Hospital Course  67 year old F PMH HTN, inguinal hernia s/p repair with mesh 1/13/2023 with removal of mesh and open repair of hernia, DVT (after surgery) RLE now on Eliquis presented to the ED with abd pain.   She had a CT abd/pelvis without acute concerning findings, noted to have findings consistent with post op wound, she continued on cont pain mgmt, antiemetics prn.  Her diet was advanced; pt still not eating well due to poor appetite.  She was evla  per Surgery Ca MENDOZA- no surgical intervention at this time, will continue to monitor.  Pt also with possible syncope prior to admission; likely due to due to dehydration and pain. Patient states she had not been eating well and pain was severe  she had no arrhythmias on tele.  She had orthostatics; still pending -------------------------.  Pt with hx of DVT RLE, it occurred s/p hernia with mesh repair in 1/2023, due to R femoral nerve compression, clot provoked and built on mesh however states that after mesh repair, clot was also removed and vein "re-opened".  She is on Eliquis 5mg BID, continue for now per Heme rec (at least for 2-3 wks then can discuss with her PCP at home in [place]).  She has a lower ext sono, with no DVT's seen.      Palliative Care / Advanced Care Planning  Code Status:  FULL CODE  Patient/Family agreeable to Hospice/Palliative- NO    Discharging Provider:  REJI Caballero  Contact Info: Cell [phone] - Please call with any questions or concerns.    Outpatient Provider:    Signout given to  SNF Provider:       Hospital Course  67 year old F PMH HTN, inguinal hernia s/p repair with mesh 1/13/2023 with removal of mesh and open repair of hernia, DVT (after surgery) RLE now on Eliquis presented to the ED with abd pain.   She had a CT abd/pelvis without acute concerning findings, noted to have findings consistent with post op wound, she continued on cont pain mgmt, antiemetics prn.  Her diet was advanced; pt still not eating well due to poor appetite.  She was evla  per Surgery Ca MENDOZA- no surgical intervention at this time, will continue to monitor.  Pt also with possible syncope prior to admission; likely due to due to dehydration and pain. Patient states she had not been eating well and pain was severe  she had no arrhythmias on tele.  She had orthostatics; still pending.  Pt with hx of DVT RLE, it occurred s/p hernia with mesh repair in 1/2023, due to R femoral nerve compression, clot provoked and built on mesh however states that after mesh repair, clot was also removed and vein "re-opened".  She is on Eliquis 5mg BID, continue for now per Heme rec (at least for 2-3 wks then can discuss with her PCP at home in [place]).  She has a lower ext sono, with no DVT's seen.      Palliative Care / Advanced Care Planning  Code Status:  FULL CODE  Patient/Family agreeable to Hospice/Palliative- NO    Discharging Provider:  REJI Caballero  Contact Info: Cell [phone] - Please call with any questions or concerns.    Outpatient Provider:    Signout given to  SNF Provider:       Hospital Course  67 year old F PMH HTN, inguinal hernia s/p repair with mesh 1/13/2023 with removal of mesh and open repair of hernia, DVT (after surgery) RLE now on Eliquis presented to the ED with abd pain.   She had a CT abd/pelvis without acute concerning findings, noted to have findings consistent with post op wound, she continued on cont pain mgmt, antiemetics prn.  Her diet was advanced; pt still not eating well due to poor appetite.  She was evla  per Surgery Ca MENDOZA- no surgical intervention at this time, will continue to monitor.  Pt also with possible syncope prior to admission; likely due to due to dehydration and pain. Patient states she had not been eating well and pain was severe  she had no arrhythmias on tele.  She had orthostatics; still pending.  Pt with hx of DVT RLE, it occurred s/p hernia with mesh repair in 1/2023, due to R femoral nerve compression, clot provoked and built on mesh however states that after mesh repair, clot was also removed and vein "re-opened".  She is on Eliquis 5mg BID, continue for now per Heme rec (at least for 2-3 wks then can discuss with her PCP at home in [place]).  She has a lower ext sono, with no DVT's seen.    Follow up with Dr Nur upon dc from [facility]    Palliative Care / Advanced Care Planning  Code Status:  FULL CODE  Patient/Family agreeable to Hospice/Palliative- NO    Discharging Provider:  Vivian MENDOZA  Contact Info: Cell [phone] - Please call with any questions or concerns.      Signout given to  SNF Provider: Dr Alan

## 2023-04-18 LAB
CULTURE RESULTS: SIGNIFICANT CHANGE UP
SPECIMEN SOURCE: SIGNIFICANT CHANGE UP

## 2023-04-18 PROCEDURE — 99232 SBSQ HOSP IP/OBS MODERATE 35: CPT

## 2023-04-18 RX ADMIN — APIXABAN 5 MILLIGRAM(S): 2.5 TABLET, FILM COATED ORAL at 09:00

## 2023-04-18 RX ADMIN — LISINOPRIL 2.5 MILLIGRAM(S): 2.5 TABLET ORAL at 21:41

## 2023-04-18 RX ADMIN — LISINOPRIL 2.5 MILLIGRAM(S): 2.5 TABLET ORAL at 09:22

## 2023-04-18 RX ADMIN — APIXABAN 5 MILLIGRAM(S): 2.5 TABLET, FILM COATED ORAL at 17:12

## 2023-04-18 NOTE — PROGRESS NOTE ADULT - ASSESSMENT
67 year old F PMH HTN, inguinal hernia s/p repair with mesh 1/13/2023 with removal of mesh and open repair of hernia, DVT (after surgery) RLE now on Eliquis presented to the ED with abdominal pain.     #Abdominal pain; probably sec. to surgical wound  - CT abd/pelvis without acute concerning findings, noted to have findings consistent with post op wound  - cont pain mgmt, antiemetics prn  - diet was advanced; pt still not eating well  - per Surgery Ca MENDOZA- no surgical intervention at this time, will continue to monitor    #Syncope  - likely due to dehydration and pain. Patient states she had not been eating well and pain was severe  - no arrhythmias on tele  - follow up orthostatics; still pending    #DVT RLE  - occurred s/p hernia with mesh repair in 1/2023, due to R femoral nerve compression, clot provoked and built on mesh however states that after mesh repair, clot was also removed and vein "re-opened"  - patient on Eliquis 5mg BID, continue for now per Heme rec (at least for 2-3 wks then can discuss with her PCP at home in [place])  - LE US neg for DVT    #HTN  - prescribed for lisinopril 7.5mg daily however states BP at home has been low so taking 1/2 tab in AM and qhs  - will continue with lisinopril 2.5mg BID    #DVT ppx  - on Eliquis    Dispo:  anticipated D/C home vs NI today. 67 year old F PMH HTN, inguinal hernia s/p repair with mesh 1/13/2023 with removal of mesh and open repair of hernia, DVT (after surgery) RLE now on Eliquis presented to the ED with abdominal pain.     #Abdominal pain; probably sec. to surgical wound--improved  - CT abd/pelvis without acute concerning findings, noted to have findings consistent with post op wound  - cont pain mgmt, antiemetics prn nausea  - diet was advanced; pt still not eating well  - per Surgery Ca MENDOZA- no surgical intervention at this time, will continue to monitor    #Syncope  - likely due to dehydration and pain. Patient states she had not been eating well and pain was severe  - no arrhythmias on tele  - follow up orthostatics; still pending    #DVT RLE  - occurred s/p hernia with mesh repair in 1/2023, due to R femoral nerve compression, clot provoked and built on mesh however states that after mesh repair, clot was also removed and vein "re-opened"  - patient on Eliquis 5mg BID, continue for now per Heme rec (at least for 2-3 wks then can discuss with her PCP at home in [place])  - LE US neg for DVT    #HTN  - prescribed for lisinopril 7.5mg daily however states BP at home has been low so taking 1/2 tab in AM and qhs  - will continue with lisinopril 2.5mg BID    #DVT ppx  - on Eliquis    Dispo:  anticipated D/C home in next 24 hrs, she will update her Family  on plan.

## 2023-04-18 NOTE — PROGRESS NOTE ADULT - SUBJECTIVE AND OBJECTIVE BOX
Patient is a 67y old  Female who presents with a chief complaint of abdominal pain (17 Apr 2023 14:52)      Patient seen and examined at bedside. No overnight events reported.     ALLERGIES:  contrast dye (Hives)  Compazine (Anaphylaxis)  epinephrine (Other)    MEDICATIONS  (STANDING):  apixaban 5 milliGRAM(s) Oral every 12 hours  lisinopril 2.5 milliGRAM(s) Oral two times a day  sodium chloride 0.9%. 1000 milliLiter(s) (60 mL/Hr) IV Continuous <Continuous>    MEDICATIONS  (PRN):  acetaminophen     Tablet .. 650 milliGRAM(s) Oral every 6 hours PRN Temp greater or equal to 38C (100.4F), Mild Pain (1 - 3)  aluminum hydroxide/magnesium hydroxide/simethicone Suspension 30 milliLiter(s) Oral every 4 hours PRN Dyspepsia  melatonin 3 milliGRAM(s) Oral at bedtime PRN Insomnia  ondansetron Injectable 4 milliGRAM(s) IV Push every 8 hours PRN Nausea and/or Vomiting  traMADol 25 milliGRAM(s) Oral every 6 hours PRN Moderate Pain (4 - 6)  traMADol 50 milliGRAM(s) Oral every 6 hours PRN Severe Pain (7 - 10)    Vital Signs Last 24 Hrs  T(F): 97.4 (18 Apr 2023 05:22), Max: 98.4 (17 Apr 2023 15:16)  HR: 62 (18 Apr 2023 05:22) (61 - 88)  BP: 120/69 (18 Apr 2023 05:22) (113/67 - 123/75)  RR: 18 (18 Apr 2023 05:22) (15 - 18)  SpO2: 97% (18 Apr 2023 05:22) (97% - 99%)  I&O's Summary    17 Apr 2023 07:01  -  18 Apr 2023 07:00  --------------------------------------------------------  IN: 0 mL / OUT: 900 mL / NET: -900 mL      PHYSICAL EXAM:  General: NAD, A/O x 3  ENT: No gross hearing impairment, Moist mucous membranes, no thrush  Neck: Supple, No JVD  Lungs: Clear to auscultation bilaterally, good air entry, non-labored breathing  Cardio: RRR, S1/S2, No murmur  Abdomen: Soft, Nondistended; right abd wound clean and dry, mildly tender, Bowel sounds present  Extremities: No calf tenderness, No cyanosis, No pitting edema  Psych: Appropriate mood and affect    LABS:                        13.4   7.29  )-----------( 129      ( 16 Apr 2023 06:06 )             40.0     04-16    142  |  106  |  8   ----------------------------<  103  4.0   |  30  |  0.60    Ca    9.4      16 Apr 2023 06:06    TPro  6.3  /  Alb  3.1  /  TBili  0.6  /  DBili  x   /  AST  29  /  ALT  31  /  AlkPhos  63  04-16          PT/INR - ( 15 Apr 2023 12:50 )   PT: 14.4 sec;   INR: 1.24 ratio         PTT - ( 15 Apr 2023 12:50 )  PTT:25.5 sec      CARDIAC MARKERS ( 15 Apr 2023 12:50 )  x     / 8.6 ng/L / x     / x     / x                                Culture - Fungal, Other (collected 13 Apr 2023 14:00)  Source: .Other Culture of Femoral Canal  Preliminary Report (15 Apr 2023 15:03):    Culture is being performed. Fungal cultures are held for 4 weeks.    Culture - Surgical Swab (collected 13 Apr 2023 14:00)  Source: .Surgical Swab Culture of Femoral Canal  Preliminary Report (14 Apr 2023 17:57):    No growth        RADIOLOGY & ADDITIONAL TESTS:    Care Discussed with Consultants/Other Providers:    Patient is a 67y old  Female who presents with a chief complaint of abdominal pain (17 Apr 2023 14:52)      Patient seen and examined at bedside. No overnight events reported.  She states after ambulating she is now feeling lightheaded and has nausea.    ALLERGIES:  contrast dye (Hives)  Compazine (Anaphylaxis)  epinephrine (Other)    MEDICATIONS  (STANDING):  apixaban 5 milliGRAM(s) Oral every 12 hours  lisinopril 2.5 milliGRAM(s) Oral two times a day  sodium chloride 0.9%. 1000 milliLiter(s) (60 mL/Hr) IV Continuous <Continuous>    MEDICATIONS  (PRN):  acetaminophen     Tablet .. 650 milliGRAM(s) Oral every 6 hours PRN Temp greater or equal to 38C (100.4F), Mild Pain (1 - 3)  aluminum hydroxide/magnesium hydroxide/simethicone Suspension 30 milliLiter(s) Oral every 4 hours PRN Dyspepsia  melatonin 3 milliGRAM(s) Oral at bedtime PRN Insomnia  ondansetron Injectable 4 milliGRAM(s) IV Push every 8 hours PRN Nausea and/or Vomiting  traMADol 25 milliGRAM(s) Oral every 6 hours PRN Moderate Pain (4 - 6)  traMADol 50 milliGRAM(s) Oral every 6 hours PRN Severe Pain (7 - 10)    Vital Signs Last 24 Hrs  T(F): 97.4 (18 Apr 2023 05:22), Max: 98.4 (17 Apr 2023 15:16)  HR: 62 (18 Apr 2023 05:22) (61 - 88)  BP: 120/69 (18 Apr 2023 05:22) (113/67 - 123/75)  RR: 18 (18 Apr 2023 05:22) (15 - 18)  SpO2: 97% (18 Apr 2023 05:22) (97% - 99%)  I&O's Summary    17 Apr 2023 07:01  -  18 Apr 2023 07:00  --------------------------------------------------------  IN: 0 mL / OUT: 900 mL / NET: -900 mL      PHYSICAL EXAM:  General: NAD, A/O x 3, very thin female.  ENT: No gross hearing impairment, Moist mucous membranes, no thrush  Neck: Supple, No JVD  Lungs: Clear to auscultation bilaterally, good air entry, non-labored breathing  Cardio: RRR, S1/S2, No murmur  Abdomen: Soft, Nondistended; right abd wound clean and dry, mildly tender, Bowel sounds present  Extremities: No calf tenderness, No cyanosis, No pitting edema  Psych: Appropriate mood and affect    LABS:                        13.4   7.29  )-----------( 129      ( 16 Apr 2023 06:06 )             40.0     04-16    142  |  106  |  8   ----------------------------<  103  4.0   |  30  |  0.60    Ca    9.4      16 Apr 2023 06:06    TPro  6.3  /  Alb  3.1  /  TBili  0.6  /  DBili  x   /  AST  29  /  ALT  31  /  AlkPhos  63  04-16          PT/INR - ( 15 Apr 2023 12:50 )   PT: 14.4 sec;   INR: 1.24 ratio         PTT - ( 15 Apr 2023 12:50 )  PTT:25.5 sec      CARDIAC MARKERS ( 15 Apr 2023 12:50 )  x     / 8.6 ng/L / x     / x     / x                                Culture - Fungal, Other (collected 13 Apr 2023 14:00)  Source: .Other Culture of Femoral Canal  Preliminary Report (15 Apr 2023 15:03):    Culture is being performed. Fungal cultures are held for 4 weeks.    Culture - Surgical Swab (collected 13 Apr 2023 14:00)  Source: .Surgical Swab Culture of Femoral Canal  Preliminary Report (14 Apr 2023 17:57):    No growth        RADIOLOGY & ADDITIONAL TESTS:    Care Discussed with Consultants/Other Providers:

## 2023-04-19 ENCOUNTER — TRANSCRIPTION ENCOUNTER (OUTPATIENT)
Age: 68
End: 2023-04-19

## 2023-04-19 VITALS
OXYGEN SATURATION: 98 % | SYSTOLIC BLOOD PRESSURE: 132 MMHG | DIASTOLIC BLOOD PRESSURE: 74 MMHG | RESPIRATION RATE: 16 BRPM | HEART RATE: 75 BPM | TEMPERATURE: 98 F

## 2023-04-19 PROCEDURE — 86901 BLOOD TYPING SEROLOGIC RH(D): CPT

## 2023-04-19 PROCEDURE — 85610 PROTHROMBIN TIME: CPT

## 2023-04-19 PROCEDURE — G0378: CPT

## 2023-04-19 PROCEDURE — 86850 RBC ANTIBODY SCREEN: CPT

## 2023-04-19 PROCEDURE — 93005 ELECTROCARDIOGRAM TRACING: CPT

## 2023-04-19 PROCEDURE — 86803 HEPATITIS C AB TEST: CPT

## 2023-04-19 PROCEDURE — 99285 EMERGENCY DEPT VISIT HI MDM: CPT

## 2023-04-19 PROCEDURE — 84484 ASSAY OF TROPONIN QUANT: CPT

## 2023-04-19 PROCEDURE — 97116 GAIT TRAINING THERAPY: CPT

## 2023-04-19 PROCEDURE — 96374 THER/PROPH/DIAG INJ IV PUSH: CPT

## 2023-04-19 PROCEDURE — 86900 BLOOD TYPING SEROLOGIC ABO: CPT

## 2023-04-19 PROCEDURE — 80053 COMPREHEN METABOLIC PANEL: CPT

## 2023-04-19 PROCEDURE — 87637 SARSCOV2&INF A&B&RSV AMP PRB: CPT

## 2023-04-19 PROCEDURE — 85025 COMPLETE CBC W/AUTO DIFF WBC: CPT

## 2023-04-19 PROCEDURE — 99239 HOSP IP/OBS DSCHRG MGMT >30: CPT

## 2023-04-19 PROCEDURE — 74176 CT ABD & PELVIS W/O CONTRAST: CPT | Mod: MA

## 2023-04-19 PROCEDURE — 85730 THROMBOPLASTIN TIME PARTIAL: CPT

## 2023-04-19 PROCEDURE — 93971 EXTREMITY STUDY: CPT

## 2023-04-19 PROCEDURE — 36415 COLL VENOUS BLD VENIPUNCTURE: CPT

## 2023-04-19 PROCEDURE — 97162 PT EVAL MOD COMPLEX 30 MIN: CPT

## 2023-04-19 RX ORDER — APIXABAN 2.5 MG/1
1 TABLET, FILM COATED ORAL
Qty: 0 | Refills: 0 | DISCHARGE
Start: 2023-04-19

## 2023-04-19 RX ORDER — LISINOPRIL 2.5 MG/1
1 TABLET ORAL
Qty: 0 | Refills: 0 | DISCHARGE
Start: 2023-04-19

## 2023-04-19 RX ADMIN — LISINOPRIL 2.5 MILLIGRAM(S): 2.5 TABLET ORAL at 08:23

## 2023-04-19 RX ADMIN — APIXABAN 5 MILLIGRAM(S): 2.5 TABLET, FILM COATED ORAL at 05:51

## 2023-04-19 NOTE — DISCHARGE NOTE NURSING/CASE MANAGEMENT/SOCIAL WORK - NSDCPEFALRISK_GEN_ALL_CORE
For information on Fall & Injury Prevention, visit: [email or web address] OR  [email or web address] OR  https://www.cdc.gov/steadi/patient.html

## 2023-04-19 NOTE — CHART NOTE - NSCHARTNOTEFT_GEN_A_CORE
NUTRITION Follow Up Note    Source:  Patient [X]  Medical Record [X]  Nursing Staff [X]  Family Member [ ]    Diet: Diet, Regular:   Supplement Feeding Modality:  Oral  Ensure Plus High Protein Cans or Servings Per Day:  1       Frequency:  Three Times a day (23 @ 10:24) [Active]  Diet, Regular (04-15-23 @ 18:31) [Available for Activation]    Patient with suboptimal PO intakes. Consuming 26-75% of meals as per nursing flow sheets. Receiving Ensure Plus High Protein 8oz PO TID (Provides 1,050kcal & 60grams of Protein) to enhance PO intakes and optimize nutritional status. Patient states lack of PO intake due to feeling constipated. Patient states that she has not had a BM since admission. Receptive to receive fiber-rich foods to promote BM regularity. Food preferences obtained to enhance PO intakes.       Skin: Surgical incision at right groin    Edema: No edema noted    Last Bowel Movement: No BM since admission    Weight Trends:  Weight in k.1 (15 Apr 2023 16:43)      Pertinent Medications: MEDICATIONS  (STANDING):  apixaban 5 milliGRAM(s) Oral every 12 hours  lisinopril 2.5 milliGRAM(s) Oral two times a day    MEDICATIONS  (PRN):  acetaminophen     Tablet .. 650 milliGRAM(s) Oral every 6 hours PRN Temp greater or equal to 38C (100.4F), Mild Pain (1 - 3)  aluminum hydroxide/magnesium hydroxide/simethicone Suspension 30 milliLiter(s) Oral every 4 hours PRN Dyspepsia  melatonin 3 milliGRAM(s) Oral at bedtime PRN Insomnia  ondansetron Injectable 4 milliGRAM(s) IV Push every 8 hours PRN Nausea and/or Vomiting  traMADol 25 milliGRAM(s) Oral every 6 hours PRN Moderate Pain (4 - 6)  traMADol 50 milliGRAM(s) Oral every 6 hours PRN Severe Pain (7 - 10)      Pertinent Labs:    Alb 3.1 g/dL<L>          Estimated Needs:   [X] No Change Since Previous Assessment    Previous Nutrition Diagnosis:   Severe Malnutrition    Nutrition Diagnosis is [X] Ongoing - Addressed w/ Ensure Plus High Protein 8oz PO TID (Provides 1,050kcal & 60grams of Protein)     New Nutrition Diagnosis: [X] Not Applicable      Interventions:   1. Recommend continue current nutrition plan of care as tolerated   2. Will receive fiber-rich foods w/ meals to promote BM regularity  3. Monitor daily PO intakes, GI tolerance, labs, weights, skin integrity, & BM regularity     Monitoring & Evaluation:   [X] Weights   [X] PO Intake   [X] Skin Integrity   [X] Follow Up (Per Protocol)  [X] Tolerance to Diet Prescription   [X] Other: Labs & POCT    Registered Dietitian/Nutritionist Remains Available.  Braxton Urias RD, CDN    Phone# [phone]

## 2023-04-19 NOTE — PROGRESS NOTE ADULT - NUTRITIONAL ASSESSMENT
This patient has been assessed with a concern for Malnutrition and has been determined to have a diagnosis/diagnoses of Severe protein-calorie malnutrition and Underweight (BMI < 19).    This patient is being managed with:   Diet Regular-  Supplement Feeding Modality:  Oral  Ensure Plus High Protein Cans or Servings Per Day:  1       Frequency:  Three Times a day  Entered: Apr 17 2023 10:24AM  
This patient has been assessed with a concern for Malnutrition and has been determined to have a diagnosis/diagnoses of Severe protein-calorie malnutrition and Underweight (BMI < 19).  
This patient has been assessed with a concern for Malnutrition and has been determined to have a diagnosis/diagnoses of Severe protein-calorie malnutrition and Underweight (BMI < 19).    This patient is being managed with:   Diet Regular-  Supplement Feeding Modality:  Oral  Ensure Plus High Protein Cans or Servings Per Day:  1       Frequency:  Three Times a day  Entered: Apr 17 2023 10:24AM  
This patient has been assessed with a concern for Malnutrition and has been determined to have a diagnosis/diagnoses of Severe protein-calorie malnutrition and Underweight (BMI < 19).  
This patient has been assessed with a concern for Malnutrition and has been determined to have a diagnosis/diagnoses of Severe protein-calorie malnutrition and Underweight (BMI < 19).    This patient is being managed with:   Diet Regular-  Entered: Apr 16 2023  2:21PM  
This patient has been assessed with a concern for Malnutrition and has been determined to have a diagnosis/diagnoses of Severe protein-calorie malnutrition and Underweight (BMI < 19).    This patient is being managed with:   Diet Regular-  Supplement Feeding Modality:  Oral  Ensure Plus High Protein Cans or Servings Per Day:  1       Frequency:  Three Times a day  Entered: Apr 17 2023 10:24AM

## 2023-04-19 NOTE — PROGRESS NOTE ADULT - NS ATTEND AMEND GEN_ALL_CORE FT
no fever or leukocytosis  non-toxic appearance  2/10 RLQ pain  surgery recommends no intervention  post-op findings on CT  heme consult to determine duration of AC ?3 months
ambulating in hallway with PT  hydrating  advised to improve caloric intake  no plan for surgical intervention  dc home ?4/18
no fever or leukocytosis  non-toxic appearance  2/10 RLQ pain  surgery recommends no intervention  post-op findings on CT  heme consult to determine duration of AC ?3 months.
NI today  outpt surgery f/up  spent 38 mins
reports nausea, weakness this AM  wishes to stay overnight and fly out tomorrow  ?considering rehab  seems unclear about what she wants to do  most symptoms are chronic and she is hydrating very well

## 2023-04-19 NOTE — PROGRESS NOTE ADULT - ASSESSMENT
67 year old F PMH HTN, inguinal hernia s/p repair with mesh 1/13/2023 with removal of mesh and open repair of hernia, DVT (after surgery) RLE now on Eliquis presented to the ED with abdominal pain.     #Abdominal pain; probably sec. to surgical wound--improved  - CT abd/pelvis without acute concerning findings, noted to have findings consistent with post op wound  - cont pain mgmt, antiemetics prn nausea  - diet was advanced; pt still not eating well  - per Surgery Ca MENDOZA- no surgical intervention at this time, will continue to monitor    #Syncope  - likely due to dehydration and pain. Patient states she had not been eating well and pain was severe  - no arrhythmias on tele  - follow up orthostatics; still pending    #DVT RLE  - occurred s/p hernia with mesh repair in 1/2023, due to R femoral nerve compression, clot provoked and built on mesh however states that after mesh repair, clot was also removed and vein "re-opened"  - patient on Eliquis 5mg BID, continue for now per Heme rec (at least for 2-3 wks then can discuss with her PCP at home in [place])  - LE US neg for DVT    #HTN  - prescribed for lisinopril 7.5mg daily however states BP at home has been low so taking 1/2 tab in AM and qhs  - will continue with lisinopril 2.5mg BID    #DVT ppx  - on Eliquis    Dispo:  anticipated D/C home in next 24 hrs, she will update her Family  on plan. 67 year old F PMH HTN, inguinal hernia s/p repair with mesh 1/13/2023 with removal of mesh and open repair of hernia, DVT (after surgery) RLE now on Eliquis presented to the ED with abdominal pain.     #Abdominal pain; probably sec. to surgical wound--improved  - CT abd/pelvis without acute concerning findings, noted to have findings consistent with post op wound  - cont pain mgmt, antiemetics prn nausea  - diet was advanced; pt still not eating well  - per Surgery Ca MENDOZA- no surgical intervention at this time, will continue to monitor    #Syncope  - likely due to dehydration and pain. Patient states she had not been eating well and pain was severe  - no arrhythmias on tele  - follow up orthostatics; reviewed not orthostatic     #DVT RLE  - occurred s/p hernia with mesh repair in 1/2023, due to R femoral nerve compression, clot provoked and built on mesh however states that after mesh repair, clot was also removed and vein "re-opened"  - patient on Eliquis 5mg BID, continue for now per Heme rec (at least for 2-3 wks then can discuss with her PCP at home in [place])  - LE US neg for DVT    #HTN  - prescribed for lisinopril 7.5mg daily however states BP at home has been low so taking 1/2 tab in AM and qhs  - will continue with lisinopril 2.5mg BID    #DVT ppx  - on Eliquis    Dispo:  anticipated D/C home in next 24 hrs, she will update her Family  on plan. pt requesting NI but denied by insurance- pt request face to face  - awaiting decision

## 2023-04-19 NOTE — DISCHARGE NOTE NURSING/CASE MANAGEMENT/SOCIAL WORK - PATIENT PORTAL LINK FT
You can access the FollowMyHealth Patient Portal offered by [facility] by registering at the following website: [email or web address]. By joining [facility]’s FollowMyHealth portal, you will also be able to view your health information using other applications (apps) compatible with our system.

## 2023-04-19 NOTE — PROGRESS NOTE ADULT - NS ATTEND OPT1 GEN_ALL_CORE
I independently performed the documented:

## 2023-04-19 NOTE — PROGRESS NOTE ADULT - SUBJECTIVE AND OBJECTIVE BOX
Patient is a 67y old  Female who presents with a chief complaint of abdominal pain (18 Apr 2023 07:45)      Patient seen and examined at bedside.    ALLERGIES:  contrast dye (Hives)  Compazine (Anaphylaxis)  epinephrine (Other)    MEDICATIONS  (STANDING):  apixaban 5 milliGRAM(s) Oral every 12 hours  lisinopril 2.5 milliGRAM(s) Oral two times a day    MEDICATIONS  (PRN):  acetaminophen     Tablet .. 650 milliGRAM(s) Oral every 6 hours PRN Temp greater or equal to 38C (100.4F), Mild Pain (1 - 3)  aluminum hydroxide/magnesium hydroxide/simethicone Suspension 30 milliLiter(s) Oral every 4 hours PRN Dyspepsia  melatonin 3 milliGRAM(s) Oral at bedtime PRN Insomnia  ondansetron Injectable 4 milliGRAM(s) IV Push every 8 hours PRN Nausea and/or Vomiting  traMADol 25 milliGRAM(s) Oral every 6 hours PRN Moderate Pain (4 - 6)  traMADol 50 milliGRAM(s) Oral every 6 hours PRN Severe Pain (7 - 10)    Vital Signs Last 24 Hrs  T(F): 97.9 (19 Apr 2023 06:01), Max: 98.3 (18 Apr 2023 15:53)  HR: 65 (19 Apr 2023 06:01) (65 - 82)  BP: 129/78 (19 Apr 2023 06:01) (119/70 - 129/78)  RR: 16 (19 Apr 2023 06:01) (16 - 16)  SpO2: 98% (19 Apr 2023 06:01) (97% - 98%)  I&O's Summary    18 Apr 2023 07:01  -  19 Apr 2023 07:00  --------------------------------------------------------  IN: 0 mL / OUT: 700 mL / NET: -700 mL      PHYSICAL EXAM:  General: NAD, A/O x 3  ENT: MMM  Neck: Supple, No JVD  Lungs: Clear to auscultation bilaterally, Non labored breathing   Cardio: RRR, S1/S2, No murmurs  Abdomen: Soft, Nontender, Nondistended; Bowel sounds present  Extremities: No calf tenderness, No pitting edema    LABS:                                          Culture - Fungal, Other (collected 13 Apr 2023 14:00)  Source: .Other Culture of Femoral Canal  Preliminary Report (15 Apr 2023 15:03):    Culture is being performed. Fungal cultures are held for 4 weeks.    Culture - Surgical Swab (collected 13 Apr 2023 14:00)  Source: .Surgical Swab Culture of Femoral Canal  Final Report (18 Apr 2023 17:46):    No growth at 5 days        RADIOLOGY & ADDITIONAL TESTS:    Care Discussed with Consultants/Other Providers:    Patient is a 67y old  Female who presents with a chief complaint of abdominal pain (18 Apr 2023 07:45)      Patient seen and examined at bedside.  Pt without events overnight .  No currently complaints     ALLERGIES:  contrast dye (Hives)  Compazine (Anaphylaxis)  epinephrine (Other)    MEDICATIONS  (STANDING):  apixaban 5 milliGRAM(s) Oral every 12 hours  lisinopril 2.5 milliGRAM(s) Oral two times a day    MEDICATIONS  (PRN):  acetaminophen     Tablet .. 650 milliGRAM(s) Oral every 6 hours PRN Temp greater or equal to 38C (100.4F), Mild Pain (1 - 3)  aluminum hydroxide/magnesium hydroxide/simethicone Suspension 30 milliLiter(s) Oral every 4 hours PRN Dyspepsia  melatonin 3 milliGRAM(s) Oral at bedtime PRN Insomnia  ondansetron Injectable 4 milliGRAM(s) IV Push every 8 hours PRN Nausea and/or Vomiting  traMADol 25 milliGRAM(s) Oral every 6 hours PRN Moderate Pain (4 - 6)  traMADol 50 milliGRAM(s) Oral every 6 hours PRN Severe Pain (7 - 10)    Vital Signs Last 24 Hrs  T(F): 97.9 (19 Apr 2023 06:01), Max: 98.3 (18 Apr 2023 15:53)  HR: 65 (19 Apr 2023 06:01) (65 - 82)  BP: 129/78 (19 Apr 2023 06:01) (119/70 - 129/78)  RR: 16 (19 Apr 2023 06:01) (16 - 16)  SpO2: 98% (19 Apr 2023 06:01) (97% - 98%)  I&O's Summary    18 Apr 2023 07:01  -  19 Apr 2023 07:00  --------------------------------------------------------  IN: 0 mL / OUT: 700 mL / NET: -700 mL      PHYSICAL EXAM:  General: 66 y/o female in NAD, A/O x 3  ENT: MMM  Neck: Supple, No JVD  Lungs: Clear to auscultation bilaterally, Non labored breathing   Cardio: RRR, S1/S2, No murmurs  Abdomen: Soft, tender around surgical site , Nondistended; Bowel sounds present  Extremities: No calf tenderness, No pitting edema    LABS:                                          Culture - Fungal, Other (collected 13 Apr 2023 14:00)  Source: .Other Culture of Femoral Canal  Preliminary Report (15 Apr 2023 15:03):    Culture is being performed. Fungal cultures are held for 4 weeks.    Culture - Surgical Swab (collected 13 Apr 2023 14:00)  Source: .Surgical Swab Culture of Femoral Canal  Final Report (18 Apr 2023 17:46):    No growth at 5 days        RADIOLOGY & ADDITIONAL TESTS:    Care Discussed with Consultants/Other Providers:

## 2023-04-20 LAB — SURGICAL PATHOLOGY STUDY: SIGNIFICANT CHANGE UP

## 2023-04-26 ENCOUNTER — TRANSCRIPTION ENCOUNTER (OUTPATIENT)
Age: 68
End: 2023-04-26

## 2023-05-03 ENCOUNTER — APPOINTMENT (OUTPATIENT)
Dept: SURGERY | Facility: CLINIC | Age: 68
End: 2023-05-03
Payer: MEDICARE

## 2023-05-03 DIAGNOSIS — Z09 ENCOUNTER FOR FOLLOW-UP EXAMINATION AFTER COMPLETED TREATMENT FOR CONDITIONS OTHER THAN MALIGNANT NEOPLASM: ICD-10-CM

## 2023-05-03 PROCEDURE — 99024 POSTOP FOLLOW-UP VISIT: CPT

## 2023-05-03 NOTE — PHYSICAL EXAM
[de-identified] : Healthy-appearing 67-year-old female no acute distress [de-identified] : She reports that she has a hard ridge in the right groin which is expected.  She denies any redness or warmth in that area.  She reports good sensation in that area.

## 2023-05-03 NOTE — HISTORY OF PRESENT ILLNESS
[de-identified] : 67-year-old female status post explantation of right femoral hernia mesh and then repair of a right inguinal hernia and a right femoral hernia tissue repair without mesh.\par She is status post a repair from April 13 2023.  She was at a rehab center for few days.  She reports that she has less of the discoloration of bilateral legs.  She still has some of the swelling in the leg mostly in the posterior portion of the knee.  Her walking is coming along.  She denies any of the systemic symptoms she was having prior to surgery which include numbing and tingling at the tips of the fingers.  She denies any body or abdominal wall rashes which she was complaining about before surgery.  She denies any of the swelling she was appreciating in the right groin prior to surgery.  She reports she has good sensation in the right groin.  And she reports that she is able to sit better compared to prior to surgery.

## 2023-05-03 NOTE — REVIEW OF SYSTEMS
[Negative] : Heme/Lymph [FreeTextEntry7] : Status post open right femoral hernia mesh removal and status post inguinal hernia and femoral hernia repair mesh free.  Doing well.

## 2023-05-03 NOTE — PLAN
[FreeTextEntry1] : 67-year-old female status post explantation of right femoral hernia mesh and then repair of right inguinal and femoral hernia with tissue without mesh.  Patient reports that she seems to be doing well.  She has a follow-up appointment with her vascular surgeon.  She will follow-up with me again in about 6 months.  Otherwise I recommended that she continue to increase her level of activity to full capacity in an incremental fashion.  Questions were answered to her satisfaction.  She is pleased with the outcome so far.

## 2023-05-03 NOTE — REASON FOR VISIT
[Post Op: _________] : a [unfilled] post op visit [FreeTextEntry1] : Status post open right femoral hernia mesh removal and repair of right inguinal hernia and femoral hernia mesh free.  Reports pain is improved.  And she is back at home now.

## 2023-05-13 LAB
CULTURE RESULTS: SIGNIFICANT CHANGE UP
SPECIMEN SOURCE: SIGNIFICANT CHANGE UP

## 2023-05-24 ENCOUNTER — APPOINTMENT (OUTPATIENT)
Dept: SURGERY | Facility: CLINIC | Age: 68
End: 2023-05-24

## 2024-02-23 ENCOUNTER — APPOINTMENT (RX ONLY)
Dept: URBAN - METROPOLITAN AREA CLINIC 373 | Facility: CLINIC | Age: 69
Setting detail: DERMATOLOGY
End: 2024-02-23

## 2024-02-23 DIAGNOSIS — L81.4 OTHER MELANIN HYPERPIGMENTATION: ICD-10-CM

## 2024-02-23 DIAGNOSIS — D22 MELANOCYTIC NEVI: ICD-10-CM

## 2024-02-23 DIAGNOSIS — L82.1 OTHER SEBORRHEIC KERATOSIS: ICD-10-CM

## 2024-02-23 DIAGNOSIS — L44.8 OTHER SPECIFIED PAPULOSQUAMOUS DISORDERS: ICD-10-CM

## 2024-02-23 DIAGNOSIS — L57.0 ACTINIC KERATOSIS: ICD-10-CM

## 2024-02-23 DIAGNOSIS — D18.0 HEMANGIOMA: ICD-10-CM

## 2024-02-23 PROBLEM — D18.01 HEMANGIOMA OF SKIN AND SUBCUTANEOUS TISSUE: Status: ACTIVE | Noted: 2024-02-23

## 2024-02-23 PROBLEM — D22.5 MELANOCYTIC NEVI OF TRUNK: Status: ACTIVE | Noted: 2024-02-23

## 2024-02-23 PROCEDURE — ? FULL BODY SKIN EXAM

## 2024-02-23 PROCEDURE — 99203 OFFICE O/P NEW LOW 30 MIN: CPT | Mod: 25

## 2024-02-23 PROCEDURE — ? LIQUID NITROGEN

## 2024-02-23 PROCEDURE — 17003 DESTRUCT PREMALG LES 2-14: CPT

## 2024-02-23 PROCEDURE — ? COUNSELING

## 2024-02-23 PROCEDURE — ? TREATMENT REGIMEN

## 2024-02-23 PROCEDURE — 17000 DESTRUCT PREMALG LESION: CPT

## 2024-02-23 PROCEDURE — ? ADDITIONAL NOTES

## 2024-02-23 ASSESSMENT — LOCATION ZONE DERM
LOCATION ZONE: LIP
LOCATION ZONE: TRUNK
LOCATION ZONE: FACE
LOCATION ZONE: ARM

## 2024-02-23 ASSESSMENT — LOCATION SIMPLE DESCRIPTION DERM
LOCATION SIMPLE: RIGHT UPPER BACK
LOCATION SIMPLE: LEFT UPPER BACK
LOCATION SIMPLE: RIGHT FOREARM
LOCATION SIMPLE: ABDOMEN
LOCATION SIMPLE: LEFT SHOULDER
LOCATION SIMPLE: RIGHT CHEEK
LOCATION SIMPLE: CHEST
LOCATION SIMPLE: LEFT FOREARM
LOCATION SIMPLE: RIGHT LIP

## 2024-02-23 ASSESSMENT — LOCATION DETAILED DESCRIPTION DERM
LOCATION DETAILED: RIGHT INFERIOR CENTRAL MALAR CHEEK
LOCATION DETAILED: LEFT PROXIMAL DORSAL FOREARM
LOCATION DETAILED: RIGHT MID-UPPER BACK
LOCATION DETAILED: RIGHT MEDIAL SUPERIOR CHEST
LOCATION DETAILED: RIGHT UPPER CUTANEOUS LIP
LOCATION DETAILED: RIGHT VENTRAL PROXIMAL FOREARM
LOCATION DETAILED: LEFT ANTERIOR SHOULDER
LOCATION DETAILED: EPIGASTRIC SKIN
LOCATION DETAILED: LEFT MID-UPPER BACK

## 2024-02-23 NOTE — PROCEDURE: MIPS QUALITY
Quality 47: Advance Care Plan: Advance Care Planning discussed and documented in the medical record; patient did not wish or was not able to name a surrogate decision maker or provide an advance care plan.
Quality 358: Patient-Centered Surgical Risk Assessment And Communication: A patient-specific risk assessment with a risk calculator was not completed or communicated to patient and/or family.
Quality 130: Documentation Of Current Medications In The Medical Record: Current Medications Documented
Quality 431: Preventive Care And Screening: Unhealthy Alcohol Use - Screening: Patient not identified as an unhealthy alcohol user when screened for unhealthy alcohol use using a systematic screening method
Detail Level: Detailed
Quality 226: Preventive Care And Screening: Tobacco Use: Screening And Cessation Intervention: Patient screened for tobacco use and is an ex/non-smoker

## 2024-02-23 NOTE — PROCEDURE: LIQUID NITROGEN
Duration Of Freeze Thaw-Cycle (Seconds): 8
Detail Level: Detailed
Post-Care Instructions: I reviewed with the patient in detail post-care instructions. Patient is to wear sunprotection, and avoid picking at any of the treated lesions. Pt may apply Vaseline to crusted or scabbing areas.
Render Note In Bullet Format When Appropriate: No
Consent: The patient's consent was obtained including but not limited to risks of crusting, scabbing, blistering, scarring, darker or lighter pigmentary change, recurrence, incomplete removal and infection.
Show Aperture Variable?: Yes

## 2024-02-23 NOTE — HPI: EVALUATION OF SKIN LESION(S)
Hpi Title: Evaluation of Skin Lesions
Additional History: Spots of concern on chest and left side of nose

## 2025-04-22 ENCOUNTER — APPOINTMENT (OUTPATIENT)
Dept: URBAN - METROPOLITAN AREA CLINIC 373 | Facility: CLINIC | Age: 70
Setting detail: DERMATOLOGY
End: 2025-04-22

## 2025-04-22 DIAGNOSIS — L72.0 EPIDERMAL CYST: ICD-10-CM

## 2025-04-22 DIAGNOSIS — L85.8 OTHER SPECIFIED EPIDERMAL THICKENING: ICD-10-CM

## 2025-04-22 DIAGNOSIS — L82.1 OTHER SEBORRHEIC KERATOSIS: ICD-10-CM

## 2025-04-22 PROCEDURE — 99213 OFFICE O/P EST LOW 20 MIN: CPT

## 2025-04-22 PROCEDURE — ? COUNSELING

## 2025-04-22 PROCEDURE — ? OTC TREATMENT REGIMEN

## 2025-04-22 ASSESSMENT — LOCATION SIMPLE DESCRIPTION DERM
LOCATION SIMPLE: CHEST
LOCATION SIMPLE: LEFT LIP
LOCATION SIMPLE: RIGHT HAND
LOCATION SIMPLE: LEFT UPPER BACK
LOCATION SIMPLE: RIGHT SHOULDER

## 2025-04-22 ASSESSMENT — LOCATION DETAILED DESCRIPTION DERM
LOCATION DETAILED: RIGHT ANTERIOR SHOULDER
LOCATION DETAILED: RIGHT RADIAL DORSAL HAND
LOCATION DETAILED: LEFT SUPERIOR UPPER BACK
LOCATION DETAILED: LEFT INFERIOR VERMILION LIP
LOCATION DETAILED: RIGHT POSTERIOR SHOULDER
LOCATION DETAILED: LEFT MEDIAL SUPERIOR CHEST
LOCATION DETAILED: LEFT UPPER CUTANEOUS LIP

## 2025-04-22 ASSESSMENT — LOCATION ZONE DERM
LOCATION ZONE: LIP
LOCATION ZONE: TRUNK
LOCATION ZONE: HAND
LOCATION ZONE: ARM

## 2025-04-22 NOTE — PROCEDURE: OTC TREATMENT REGIMEN
Patient Specific Otc Recommendations (Will Not Stick From Patient To Patient): Urea 40% cream - apply to hand daily
Detail Level: Zone

## 2025-04-22 NOTE — PROCEDURE: COUNSELING
Detail Level: Zone
Detail Level: Simple
Detail Level: Detailed
Topical Keratolytics Recommendations: . \\nurea 40% cream

## (undated) DEVICE — STAPLER SKIN VISI-STAT 35 WIDE

## (undated) DEVICE — ELCTR BOVIE PENCIL SMOKE EVACUATION

## (undated) DEVICE — SUT POLYSORB 3-0 18" V-20 UNDYED

## (undated) DEVICE — SPECIMEN CONTAINER 100ML

## (undated) DEVICE — SOLIDIFIER CANN EXPRESS 3K

## (undated) DEVICE — LAP PAD 18 X 18"

## (undated) DEVICE — VENODYNE/SCD SLEEVE CALF MEDIUM

## (undated) DEVICE — SUT SURGIPRO 0 30" GS-22

## (undated) DEVICE — ELCTR STRYKER NEPTUNE SMOKE EVACUATION PENCIL (GREEN)

## (undated) DEVICE — DRSG STERISTRIPS 0.5 X 4"

## (undated) DEVICE — SUT POLYSORB 3-0 30" V-20 UNDYED

## (undated) DEVICE — CANISTER SUCTION LID GUARD 3000CC

## (undated) DEVICE — PACK MINOR

## (undated) DEVICE — GLV 6.5 PROTEXIS (WHITE)

## (undated) DEVICE — SUT SURGIPRO 2-0 30" V-20

## (undated) DEVICE — SUT POLYSORB 2-0 18" V-20

## (undated) DEVICE — SUT POLYSORB 4-0 30" C-13 UNDYED

## (undated) DEVICE — DRAPE LIGHT HANDLE COVER (GREEN)

## (undated) DEVICE — SUT CHROMIC 2-0 54" REEL

## (undated) DEVICE — SOL IRR POUR H2O 1500ML

## (undated) DEVICE — WARMING BLANKET UPPER ADULT

## (undated) DEVICE — DRSG CURITY GAUZE SPONGE 4 X 4" 12-PLY

## (undated) DEVICE — SUT VICRYL 4-0 27" SH

## (undated) DEVICE — GLV 7.5 ULTRAFREE MAX

## (undated) DEVICE — DRAIN PENROSE .25" X 12" SILICONE

## (undated) DEVICE — SOL IRR POUR NS 0.9% 500ML

## (undated) DEVICE — SUT CHROMIC 2-0 60" REEL

## (undated) DEVICE — POSITIONER FOAM HEAD CRADLE (PINK)

## (undated) DEVICE — PREP CHLORAPREP HI-LITE ORANGE 26ML